# Patient Record
Sex: MALE | Race: WHITE | NOT HISPANIC OR LATINO | Employment: FULL TIME | URBAN - METROPOLITAN AREA
[De-identification: names, ages, dates, MRNs, and addresses within clinical notes are randomized per-mention and may not be internally consistent; named-entity substitution may affect disease eponyms.]

---

## 2018-02-12 ENCOUNTER — OFFICE VISIT CONVERTED (OUTPATIENT)
Dept: FAMILY MEDICINE CLINIC | Facility: CLINIC | Age: 48
End: 2018-02-12
Attending: PHYSICIAN ASSISTANT

## 2018-02-12 ENCOUNTER — CONVERSION ENCOUNTER (OUTPATIENT)
Dept: FAMILY MEDICINE CLINIC | Facility: CLINIC | Age: 48
End: 2018-02-12

## 2018-06-14 ENCOUNTER — OFFICE VISIT CONVERTED (OUTPATIENT)
Dept: FAMILY MEDICINE CLINIC | Facility: CLINIC | Age: 48
End: 2018-06-14
Attending: PHYSICIAN ASSISTANT

## 2018-06-14 ENCOUNTER — CONVERSION ENCOUNTER (OUTPATIENT)
Dept: FAMILY MEDICINE CLINIC | Facility: CLINIC | Age: 48
End: 2018-06-14

## 2019-01-15 ENCOUNTER — CONVERSION ENCOUNTER (OUTPATIENT)
Dept: FAMILY MEDICINE CLINIC | Facility: CLINIC | Age: 49
End: 2019-01-15

## 2019-01-15 ENCOUNTER — OFFICE VISIT CONVERTED (OUTPATIENT)
Dept: FAMILY MEDICINE CLINIC | Facility: CLINIC | Age: 49
End: 2019-01-15
Attending: PHYSICIAN ASSISTANT

## 2019-08-15 ENCOUNTER — OFFICE VISIT CONVERTED (OUTPATIENT)
Dept: FAMILY MEDICINE CLINIC | Facility: CLINIC | Age: 49
End: 2019-08-15
Attending: PHYSICIAN ASSISTANT

## 2020-03-02 ENCOUNTER — OFFICE VISIT CONVERTED (OUTPATIENT)
Dept: FAMILY MEDICINE CLINIC | Facility: CLINIC | Age: 50
End: 2020-03-02
Attending: PHYSICIAN ASSISTANT

## 2020-03-02 ENCOUNTER — CONVERSION ENCOUNTER (OUTPATIENT)
Dept: FAMILY MEDICINE CLINIC | Facility: CLINIC | Age: 50
End: 2020-03-02

## 2020-03-02 ENCOUNTER — HOSPITAL ENCOUNTER (OUTPATIENT)
Dept: LAB | Facility: HOSPITAL | Age: 50
Discharge: HOME | End: 2020-03-02
Attending: PHYSICIAN ASSISTANT

## 2020-03-02 LAB
25(OH)D3 SERPL-MCNC: 15.4 NG/ML (ref 30–100)
ALBUMIN SERPL-MCNC: 4.6 G/DL (ref 3.5–5)
ALBUMIN/GLOB SERPL: 1.6 {RATIO} (ref 1.4–2.6)
ALP SERPL-CCNC: 64 U/L (ref 53–128)
ALT SERPL-CCNC: 28 U/L (ref 10–40)
ANION GAP SERPL CALC-SCNC: 18 MMOL/L (ref 8–19)
APPEARANCE UR: CLEAR
AST SERPL-CCNC: 23 U/L (ref 15–50)
BASOPHILS # BLD AUTO: 0.03 10*3/UL (ref 0–0.2)
BASOPHILS NFR BLD AUTO: 0.5 % (ref 0–3)
BILIRUB SERPL-MCNC: 0.29 MG/DL (ref 0.2–1.3)
BILIRUB UR QL: NEGATIVE
BUN SERPL-MCNC: 17 MG/DL (ref 5–25)
BUN/CREAT SERPL: 18 {RATIO} (ref 6–20)
CALCIUM SERPL-MCNC: 9.4 MG/DL (ref 8.7–10.4)
CHLORIDE SERPL-SCNC: 103 MMOL/L (ref 99–111)
CHOLEST SERPL-MCNC: 187 MG/DL (ref 107–200)
CHOLEST/HDLC SERPL: 4.3 {RATIO} (ref 3–6)
COLOR UR: YELLOW
CONV ABS IMM GRAN: 0.02 10*3/UL (ref 0–0.2)
CONV CO2: 23 MMOL/L (ref 22–32)
CONV COLLECTION SOURCE (UA): NORMAL
CONV IMMATURE GRAN: 0.3 % (ref 0–1.8)
CONV TOTAL PROTEIN: 7.4 G/DL (ref 6.3–8.2)
CONV UROBILINOGEN IN URINE BY AUTOMATED TEST STRIP: 0.2 {EHRLICHU}/DL (ref 0.1–1)
CREAT UR-MCNC: 0.96 MG/DL (ref 0.7–1.2)
DEPRECATED RDW RBC AUTO: 40.8 FL (ref 35.1–43.9)
EOSINOPHIL # BLD AUTO: 0.04 10*3/UL (ref 0–0.7)
EOSINOPHIL # BLD AUTO: 0.7 % (ref 0–7)
ERYTHROCYTE [DISTWIDTH] IN BLOOD BY AUTOMATED COUNT: 12.3 % (ref 11.6–14.4)
GFR SERPLBLD BASED ON 1.73 SQ M-ARVRAT: >60 ML/MIN/{1.73_M2}
GLOBULIN UR ELPH-MCNC: 2.8 G/DL (ref 2–3.5)
GLUCOSE SERPL-MCNC: 89 MG/DL (ref 70–99)
GLUCOSE UR QL: NEGATIVE MG/DL
HCT VFR BLD AUTO: 44.9 % (ref 42–52)
HDLC SERPL-MCNC: 44 MG/DL (ref 40–60)
HGB BLD-MCNC: 14.6 G/DL (ref 14–18)
HGB UR QL STRIP: NEGATIVE
KETONES UR QL STRIP: NEGATIVE MG/DL
LDLC SERPL CALC-MCNC: 123 MG/DL (ref 70–100)
LEUKOCYTE ESTERASE UR QL STRIP: NEGATIVE
LYMPHOCYTES # BLD AUTO: 1.89 10*3/UL (ref 1–5)
LYMPHOCYTES NFR BLD AUTO: 31.3 % (ref 20–45)
MCH RBC QN AUTO: 29.2 PG (ref 27–31)
MCHC RBC AUTO-ENTMCNC: 32.5 G/DL (ref 33–37)
MCV RBC AUTO: 89.8 FL (ref 80–96)
MONOCYTES # BLD AUTO: 0.44 10*3/UL (ref 0.2–1.2)
MONOCYTES NFR BLD AUTO: 7.3 % (ref 3–10)
NEUTROPHILS # BLD AUTO: 3.61 10*3/UL (ref 2–8)
NEUTROPHILS NFR BLD AUTO: 59.9 % (ref 30–85)
NITRITE UR QL STRIP: NEGATIVE
NRBC CBCN: 0 % (ref 0–0.7)
OSMOLALITY SERPL CALC.SUM OF ELEC: 291 MOSM/KG (ref 273–304)
PH UR STRIP.AUTO: 6 [PH] (ref 5–8)
PLATELET # BLD AUTO: 265 10*3/UL (ref 130–400)
PMV BLD AUTO: 10.8 FL (ref 9.4–12.4)
POTASSIUM SERPL-SCNC: 4.1 MMOL/L (ref 3.5–5.3)
PROT UR QL: NEGATIVE MG/DL
PSA SERPL-MCNC: 3.16 NG/ML (ref 0–4)
RBC # BLD AUTO: 5 10*6/UL (ref 4.7–6.1)
SODIUM SERPL-SCNC: 140 MMOL/L (ref 135–147)
SP GR UR: 1.01 (ref 1–1.03)
T4 FREE SERPL-MCNC: 1.2 NG/DL (ref 0.9–1.8)
TRIGL SERPL-MCNC: 102 MG/DL (ref 40–150)
TSH SERPL-ACNC: 2.83 M[IU]/L (ref 0.27–4.2)
VLDLC SERPL-MCNC: 20 MG/DL (ref 5–37)
WBC # BLD AUTO: 6.03 10*3/UL (ref 4.8–10.8)

## 2020-03-03 LAB
CONV MUMPS ANTIBODY IGG: 287 AU/ML
HAV AB SER QL IA: NEGATIVE
HBV SURFACE AB SER QL: NON REACTIVE
MEV IGG SER IA-ACNC: 265 AU/ML
RUBV IGG SER-ACNC: 93.88 [IU]/ML
VZV IGG SER IA-ACNC: 1107 INDEX

## 2020-03-04 LAB
B PERT IGA SER-ACNC: <1 INDEX (ref 0–0.9)
B PERT IGG SER-ACNC: <0.95 INDEX (ref 0–0.94)
B PERT IGM SER-ACNC: 1.1 INDEX (ref 0–0.9)
C DIPHTHERIAE AB SER IA-ACNC: 0.67 IU/ML
C TETANI IGG SER QL: 4.85 IU/ML

## 2020-03-10 LAB
POLIO VIRUS 1 AB [UNITS/VOLUME] IN SERUM: NORMAL
POLIO VIRUS 3 AB [UNITS/VOLUME] IN SERUM: NORMAL

## 2021-05-15 VITALS
HEIGHT: 72 IN | SYSTOLIC BLOOD PRESSURE: 113 MMHG | OXYGEN SATURATION: 96 % | BODY MASS INDEX: 24.67 KG/M2 | DIASTOLIC BLOOD PRESSURE: 76 MMHG | WEIGHT: 182.12 LBS | HEART RATE: 60 BPM

## 2021-05-15 VITALS
SYSTOLIC BLOOD PRESSURE: 131 MMHG | OXYGEN SATURATION: 97 % | HEART RATE: 79 BPM | WEIGHT: 188 LBS | HEIGHT: 72 IN | DIASTOLIC BLOOD PRESSURE: 83 MMHG | BODY MASS INDEX: 25.47 KG/M2

## 2021-05-16 VITALS
DIASTOLIC BLOOD PRESSURE: 70 MMHG | BODY MASS INDEX: 24.65 KG/M2 | WEIGHT: 182 LBS | HEIGHT: 72 IN | SYSTOLIC BLOOD PRESSURE: 110 MMHG | HEART RATE: 96 BPM | OXYGEN SATURATION: 95 %

## 2021-05-16 VITALS
WEIGHT: 191 LBS | OXYGEN SATURATION: 99 % | DIASTOLIC BLOOD PRESSURE: 70 MMHG | BODY MASS INDEX: 25.87 KG/M2 | HEIGHT: 72 IN | HEART RATE: 62 BPM | SYSTOLIC BLOOD PRESSURE: 110 MMHG

## 2021-05-16 VITALS
HEIGHT: 72 IN | OXYGEN SATURATION: 98 % | WEIGHT: 174 LBS | HEART RATE: 78 BPM | DIASTOLIC BLOOD PRESSURE: 70 MMHG | SYSTOLIC BLOOD PRESSURE: 110 MMHG | BODY MASS INDEX: 23.57 KG/M2

## 2021-08-03 DIAGNOSIS — E55.9 VITAMIN D DEFICIENCY: Primary | ICD-10-CM

## 2021-08-03 RX ORDER — ERGOCALCIFEROL 1.25 MG/1
CAPSULE ORAL
Qty: 12 CAPSULE | Refills: 1 | Status: SHIPPED | OUTPATIENT
Start: 2021-08-03 | End: 2022-01-28

## 2022-01-28 DIAGNOSIS — E55.9 VITAMIN D DEFICIENCY: ICD-10-CM

## 2022-01-28 RX ORDER — ERGOCALCIFEROL 1.25 MG/1
CAPSULE ORAL
Qty: 12 CAPSULE | Refills: 1 | Status: SHIPPED | OUTPATIENT
Start: 2022-01-28 | End: 2022-11-22

## 2022-07-20 PROCEDURE — 87591 N.GONORRHOEAE DNA AMP PROB: CPT | Performed by: NURSE PRACTITIONER

## 2022-07-20 PROCEDURE — 87491 CHLMYD TRACH DNA AMP PROBE: CPT | Performed by: NURSE PRACTITIONER

## 2022-07-20 PROCEDURE — 87086 URINE CULTURE/COLONY COUNT: CPT | Performed by: NURSE PRACTITIONER

## 2022-07-21 ENCOUNTER — TELEPHONE (OUTPATIENT)
Dept: URGENT CARE | Facility: CLINIC | Age: 52
End: 2022-07-21

## 2022-10-27 ENCOUNTER — TELEPHONE (OUTPATIENT)
Dept: URGENT CARE | Facility: CLINIC | Age: 52
End: 2022-10-27

## 2022-10-27 PROCEDURE — 87591 N.GONORRHOEAE DNA AMP PROB: CPT | Performed by: NURSE PRACTITIONER

## 2022-10-27 PROCEDURE — 87491 CHLMYD TRACH DNA AMP PROBE: CPT | Performed by: NURSE PRACTITIONER

## 2022-10-27 PROCEDURE — 87086 URINE CULTURE/COLONY COUNT: CPT | Performed by: NURSE PRACTITIONER

## 2022-10-27 NOTE — TELEPHONE ENCOUNTER
Called patient, results reviewed, complete doxycycline as prescribed.  Sexual partner will need treatment as well.  Recommend no sex x1 week after all parties have completed treatment.  Recommend routine condom use.  Follow-up with PCP as needed or if symptoms worsen or persist.  Patient verbalizes understanding.

## 2022-10-29 ENCOUNTER — TELEPHONE (OUTPATIENT)
Dept: URGENT CARE | Facility: CLINIC | Age: 52
End: 2022-10-29

## 2022-10-29 NOTE — TELEPHONE ENCOUNTER
----- Message from RANDY Velázquez sent at 10/28/2022  9:27 PM EDT -----  Please call the patient regarding his negative result.

## 2022-11-22 ENCOUNTER — LAB (OUTPATIENT)
Dept: LAB | Facility: HOSPITAL | Age: 52
End: 2022-11-22

## 2022-11-22 ENCOUNTER — OFFICE VISIT (OUTPATIENT)
Dept: FAMILY MEDICINE CLINIC | Facility: CLINIC | Age: 52
End: 2022-11-22

## 2022-11-22 VITALS
BODY MASS INDEX: 23.84 KG/M2 | SYSTOLIC BLOOD PRESSURE: 109 MMHG | HEART RATE: 66 BPM | OXYGEN SATURATION: 99 % | TEMPERATURE: 97.8 F | DIASTOLIC BLOOD PRESSURE: 70 MMHG | WEIGHT: 176 LBS | HEIGHT: 72 IN

## 2022-11-22 DIAGNOSIS — Z12.5 SCREENING FOR PROSTATE CANCER: ICD-10-CM

## 2022-11-22 DIAGNOSIS — F43.9 STRESS: ICD-10-CM

## 2022-11-22 DIAGNOSIS — Z13.220 LIPID SCREENING: ICD-10-CM

## 2022-11-22 DIAGNOSIS — Z13.29 THYROID DISORDER SCREENING: ICD-10-CM

## 2022-11-22 DIAGNOSIS — Z13.6 SCREENING FOR CARDIOVASCULAR CONDITION: ICD-10-CM

## 2022-11-22 DIAGNOSIS — Z12.11 SCREEN FOR COLON CANCER: ICD-10-CM

## 2022-11-22 DIAGNOSIS — F41.9 ANXIETY: Primary | ICD-10-CM

## 2022-11-22 LAB
BASOPHILS # BLD AUTO: 0.02 10*3/MM3 (ref 0–0.2)
BASOPHILS NFR BLD AUTO: 0.4 % (ref 0–1.5)
DEPRECATED RDW RBC AUTO: 39.7 FL (ref 37–54)
EOSINOPHIL # BLD AUTO: 0.06 10*3/MM3 (ref 0–0.4)
EOSINOPHIL NFR BLD AUTO: 1.1 % (ref 0.3–6.2)
ERYTHROCYTE [DISTWIDTH] IN BLOOD BY AUTOMATED COUNT: 12.2 % (ref 12.3–15.4)
HCT VFR BLD AUTO: 41.4 % (ref 37.5–51)
HGB BLD-MCNC: 13.9 G/DL (ref 13–17.7)
IMM GRANULOCYTES # BLD AUTO: 0.01 10*3/MM3 (ref 0–0.05)
IMM GRANULOCYTES NFR BLD AUTO: 0.2 % (ref 0–0.5)
LYMPHOCYTES # BLD AUTO: 1.76 10*3/MM3 (ref 0.7–3.1)
LYMPHOCYTES NFR BLD AUTO: 32.2 % (ref 19.6–45.3)
MCH RBC QN AUTO: 29.9 PG (ref 26.6–33)
MCHC RBC AUTO-ENTMCNC: 33.6 G/DL (ref 31.5–35.7)
MCV RBC AUTO: 89 FL (ref 79–97)
MONOCYTES # BLD AUTO: 0.38 10*3/MM3 (ref 0.1–0.9)
MONOCYTES NFR BLD AUTO: 6.9 % (ref 5–12)
NEUTROPHILS NFR BLD AUTO: 3.24 10*3/MM3 (ref 1.7–7)
NEUTROPHILS NFR BLD AUTO: 59.2 % (ref 42.7–76)
NRBC BLD AUTO-RTO: 0 /100 WBC (ref 0–0.2)
PLATELET # BLD AUTO: 246 10*3/MM3 (ref 140–450)
PMV BLD AUTO: 10.3 FL (ref 6–12)
PSA SERPL-MCNC: 5.37 NG/ML (ref 0–4)
RBC # BLD AUTO: 4.65 10*6/MM3 (ref 4.14–5.8)
T4 FREE SERPL-MCNC: 1.08 NG/DL (ref 0.93–1.7)
TSH SERPL DL<=0.05 MIU/L-ACNC: 1.41 UIU/ML (ref 0.27–4.2)
WBC NRBC COR # BLD: 5.47 10*3/MM3 (ref 3.4–10.8)

## 2022-11-22 PROCEDURE — 80061 LIPID PANEL: CPT | Performed by: NURSE PRACTITIONER

## 2022-11-22 PROCEDURE — 80050 GENERAL HEALTH PANEL: CPT

## 2022-11-22 PROCEDURE — 36415 COLL VENOUS BLD VENIPUNCTURE: CPT

## 2022-11-22 PROCEDURE — G0103 PSA SCREENING: HCPCS

## 2022-11-22 PROCEDURE — 99203 OFFICE O/P NEW LOW 30 MIN: CPT | Performed by: NURSE PRACTITIONER

## 2022-11-22 PROCEDURE — 84439 ASSAY OF FREE THYROXINE: CPT

## 2022-11-22 NOTE — PROGRESS NOTES
"  ENCOUNTER DATE:  11/22/2022    Smooth Tse / 52 y.o. / male      CHIEF COMPLAINT     Establish Care (Gene Site Testing )      VITALS     Visit Vitals  /70 (BP Location: Left arm, Patient Position: Sitting, Cuff Size: Adult)   Pulse 66   Temp 97.8 °F (36.6 °C) (Temporal)   Ht 182.9 cm (72\")   Wt 79.8 kg (176 lb)   SpO2 99%   BMI 23.87 kg/m²       BP Readings from Last 3 Encounters:   11/22/22 109/70   10/27/22 120/78   07/20/22 123/77     Wt Readings from Last 3 Encounters:   11/22/22 79.8 kg (176 lb)   10/27/22 79.4 kg (175 lb)   07/20/22 81.2 kg (179 lb)      Body mass index is 23.87 kg/m².    MEDICATIONS     Current Outpatient Medications on File Prior to Visit   Medication Sig Dispense Refill   • vitamin D (ERGOCALCIFEROL) 1.25 MG (95005 UT) capsule capsule TAKE ONE CAPSULE BY MOUTH ONCE WEEKLY 12 capsule 1     No current facility-administered medications on file prior to visit.         HISTORY OF PRESENT ILLNESS     Smooth presents for annual health maintenance visit.    · Last health maintenance visit: {VALENTINA ROSARIO WHEN (Optional):21262}  · General health: {GOOD/FAIR/POOR/EXCELLENT:74398}  · Lifestyle:  · Attempting to lose weight?: {VALENTINA ROSARIO YES/NO (Optional):21261}  · Diet: {melissa rosario diet:12020}  · Exercise: {melissa rosario exercise:95387}  · Tobacco: {Tobacco use:77400}   · Alcohol: {Alcohol consumption:17226}  · Work: {VALENTINA ROSAROI WORK:48779}  · Domestic abuse concerns: {VALENTINA ROSARIO YES/NO (Optional):21261}  · Depression Screening:          PHQ-9 Depression Screening  Little interest or pleasure in doing things? 0-->not at all   Feeling down, depressed, or hopeless? 0-->not at all   Trouble falling or staying asleep, or sleeping too much?     Feeling tired or having little energy?     Poor appetite or overeating?     Feeling bad about yourself - or that you are a failure or have let yourself or your family down?     Trouble concentrating on things, such as reading the newspaper or watching television?     Moving or " speaking so slowly that other people could have noticed? Or the opposite - being so fidgety or restless that you have been moving around a lot more than usual?     Thoughts that you would be better off dead, or of hurting yourself in some way?     PHQ-9 Total Score 0   If you checked off any problems, how difficult have these problems made it for you to do your work, take care of things at home, or get along with other people?           SAUD-7           Patient Care Team:  Chelsi Payton APRN as PCP - General (Nurse Practitioner)      ALLERGIES  No Known Allergies     PFSH:     The following portions of the patient's history were reviewed and updated as appropriate: Allergies / Current Medications / Past Medical History / Surgical History / Social History / Family History    PROBLEM LIST   There is no problem list on file for this patient.      PAST MEDICAL HISTORY  Past Medical History:   Diagnosis Date   • Anxiety    • Depression        SURGICAL HISTORY  Past Surgical History:   Procedure Laterality Date   • VASECTOMY         SOCIAL HISTORY  Social History     Socioeconomic History   • Marital status:    Tobacco Use   • Smoking status: Never   • Smokeless tobacco: Never   Vaping Use   • Vaping Use: Former   Substance and Sexual Activity   • Alcohol use: Not Currently   • Drug use: Never   • Sexual activity: Yes     Partners: Female     Birth control/protection: Condom, Vasectomy       FAMILY HISTORY  Family History   Problem Relation Age of Onset   • Depression Mother    • Cancer Mother    • Anxiety disorder Mother    • Depression Father    • Cancer Father    • Anxiety disorder Father        IMMUNIZATION HISTORY    There is no immunization history on file for this patient.    HPI  HPI    Physical exam  Physical Exam          REVIEWED DATA      Labs:    Lab Results   Component Value Date     03/02/2020    K 4.1 03/02/2020    CALCIUM 9.4 03/02/2020    AST 23 03/02/2020    ALT 28 03/02/2020    BUN 17  03/02/2020    CREATININE 0.96 03/02/2020       Lab Results   Component Value Date    TSH 2.830 03/02/2020    FREET4 1.2 03/02/2020       Lab Results   Component Value Date     (H) 03/02/2020    HDL 44 03/02/2020    TRIG 102 03/02/2020    CHOLHDLRATIO 4.3 03/02/2020       Lab Results   Component Value Date    RYFJ53VU 15.4 (L) 03/02/2020        Lab Results   Component Value Date    WBC 6.03 03/02/2020    HGB 14.6 03/02/2020    MCV 89.8 03/02/2020     03/02/2020       Lab Results   Component Value Date    GLUCOSEU NEGATIVE 03/02/2020    BLOODU NEGATIVE 03/02/2020    NITRITEU NEGATIVE 03/02/2020    LEUKOCYTESUR Small (1+) (A) 10/27/2022        No results found for: HEPCVIRUSABY        ASSESSMENT & PLAN     There are no diagnoses linked to this encounter.    HEALTHCARE MAINTENANCE ISSUES     Cancer Screening:  · Colon: Initial/Next screening at age: {VALENTINA ROMERO AGE SCREEN:21263}  · Repeat colon cancer screening: {VALENTINA ROMERO YEARS INTERVAL:21264}  · Skin: Monthly self skin examination, annual exam by health professional      Lifestyle Counseling:  · Lifestyle Modifications: {Lifestyle:09258}  · Safety Issues: Always wear seatbelt, Avoid texting while driving   · Use sunscreen, regular skin examination  · Recommended annual dental/vision examination.  · Emotional/Stress/Sleep: Reviewed and  given when appropriate      Part of this note may be electronic transcription/translation of spoken language to printed text using the Dragon dictation system

## 2022-11-22 NOTE — PROGRESS NOTES
"Chief Complaint  Establish Care (Gene Site Testing )    Subjective         Smooth Tse presents to [facility]  HPI     Wants to talk about GeneSite Testing.  His daughter got it done and now he wants to do it.      Anxiety and Stress- Seeing a therapist for anxiety and helps alot      PHQ-2 Total Score: 0  PHQ-9 Total Score: 0       Social History     Socioeconomic History   • Marital status:    Tobacco Use   • Smoking status: Never   • Smokeless tobacco: Never   Vaping Use   • Vaping Use: Former   Substance and Sexual Activity   • Alcohol use: Not Currently   • Drug use: Never   • Sexual activity: Yes     Partners: Female     Birth control/protection: Condom, Vasectomy        Objective     Vitals:    11/22/22 1052   BP: 109/70   BP Location: Left arm   Patient Position: Sitting   Cuff Size: Adult   Pulse: 66   Temp: 97.8 °F (36.6 °C)   TempSrc: Temporal   SpO2: 99%   Weight: 79.8 kg (176 lb)   Height: 182.9 cm (72\")        Body mass index is 23.87 kg/m².    Wt Readings from Last 3 Encounters:   11/22/22 79.8 kg (176 lb)   10/27/22 79.4 kg (175 lb)   07/20/22 81.2 kg (179 lb)       BP Readings from Last 3 Encounters:   11/22/22 109/70   10/27/22 120/78   07/20/22 123/77       BMI is within normal parameters. No other follow-up for BMI required.          Physical Exam  Vitals reviewed.   Constitutional:       Appearance: Normal appearance.   HENT:      Head: Normocephalic and atraumatic.   Eyes:      Conjunctiva/sclera: Conjunctivae normal.      Pupils: Pupils are equal, round, and reactive to light.   Cardiovascular:      Rate and Rhythm: Normal rate and regular rhythm.      Pulses: Normal pulses.      Heart sounds: Normal heart sounds.   Pulmonary:      Effort: Pulmonary effort is normal.      Breath sounds: Normal breath sounds.   Abdominal:      General: Bowel sounds are normal.      Palpations: Abdomen is soft.   Musculoskeletal:      Cervical back: Normal range of " motion.   Skin:     General: Skin is warm and dry.   Neurological:      Mental Status: He is alert and oriented to person, place, and time.   Psychiatric:         Mood and Affect: Mood normal.         Behavior: Behavior normal.         Thought Content: Thought content normal.         Judgment: Judgment normal.          Result Review :   The following data was reviewed by: RANDY Landa on 11/22/2022:      Procedures    Assessment and Plan   Diagnoses and all orders for this visit:    1. Anxiety (Primary)  -     GeneSight - Swab,; Future    2. Stress  -     GeneSight - Swab,; Future    3. Lipid screening  -     Lipid Panel    4. Screening for cardiovascular condition  -     CBC w AUTO Differential; Future  -     Comprehensive metabolic panel; Future    5. Screening for prostate cancer  -     PSA SCREENING; Future    6. Screen for colon cancer  -     Cologuard - Stool, Per Rectum; Future    7. Thyroid disorder screening  -     TSH; Future  -     T4, free; Future            Follow Up   No follow-ups on file.  Patient was given instructions and counseling regarding his condition or for health maintenance advice. Please see specific information pulled into the AVS if appropriate.     Please note that portions of this note were completed with a voice recognition program.    Electronically signed by RANDY Landa  11/22/2022, 11:16 EST

## 2022-11-23 LAB
ALBUMIN SERPL-MCNC: 4.3 G/DL (ref 3.5–5.2)
ALBUMIN/GLOB SERPL: 1.7 G/DL
ALP SERPL-CCNC: 70 U/L (ref 39–117)
ALT SERPL W P-5'-P-CCNC: 16 U/L (ref 1–41)
ANION GAP SERPL CALCULATED.3IONS-SCNC: 9.2 MMOL/L (ref 5–15)
AST SERPL-CCNC: 20 U/L (ref 1–40)
BILIRUB SERPL-MCNC: 0.3 MG/DL (ref 0–1.2)
BUN SERPL-MCNC: 16 MG/DL (ref 6–20)
BUN/CREAT SERPL: 18 (ref 7–25)
CALCIUM SPEC-SCNC: 8.9 MG/DL (ref 8.6–10.5)
CHLORIDE SERPL-SCNC: 102 MMOL/L (ref 98–107)
CHOLEST SERPL-MCNC: 190 MG/DL (ref 0–200)
CO2 SERPL-SCNC: 28.8 MMOL/L (ref 22–29)
CREAT SERPL-MCNC: 0.89 MG/DL (ref 0.76–1.27)
EGFRCR SERPLBLD CKD-EPI 2021: 103.8 ML/MIN/1.73
GLOBULIN UR ELPH-MCNC: 2.5 GM/DL
GLUCOSE SERPL-MCNC: 118 MG/DL (ref 65–99)
HDLC SERPL-MCNC: 47 MG/DL (ref 40–60)
LDLC SERPL CALC-MCNC: 124 MG/DL (ref 0–100)
LDLC/HDLC SERPL: 2.6 {RATIO}
POTASSIUM SERPL-SCNC: 3.9 MMOL/L (ref 3.5–5.2)
PROT SERPL-MCNC: 6.8 G/DL (ref 6–8.5)
SODIUM SERPL-SCNC: 140 MMOL/L (ref 136–145)
TRIGL SERPL-MCNC: 105 MG/DL (ref 0–150)
VLDLC SERPL-MCNC: 19 MG/DL (ref 5–40)

## 2022-11-23 RX ORDER — CIPROFLOXACIN 500 MG/1
500 TABLET, FILM COATED ORAL 2 TIMES DAILY
Qty: 60 TABLET | Refills: 0 | Status: SHIPPED | OUTPATIENT
Start: 2022-11-23 | End: 2023-01-02

## 2022-11-28 ENCOUNTER — TELEPHONE (OUTPATIENT)
Dept: FAMILY MEDICINE CLINIC | Facility: CLINIC | Age: 52
End: 2022-11-28

## 2022-11-28 DIAGNOSIS — R97.20 ELEVATED PSA: Primary | ICD-10-CM

## 2022-11-28 NOTE — TELEPHONE ENCOUNTER
S/w RANDY Chadwick about this matter. She stated for pt to discontinue medication and place referral for Urology to further treat. Called pt to advise him of this and no answer. Left vm for pt to call office back

## 2022-11-28 NOTE — TELEPHONE ENCOUNTER
Caller: Smooth Tse    Relationship: Self    Best call back number: [phone]    What medications are you currently taking:   Current Outpatient Medications on File Prior to Visit   Medication Sig Dispense Refill   • ciprofloxacin (Cipro) 500 MG tablet Take 1 tablet by mouth 2 (Two) Times a Day. 60 tablet 0     No current facility-administered medications on file prior to visit.          When did you start taking these medications: 11.22.2022    Which medication are you concerned about: ciprofloxacin (Cipro) 500 MG tablet    Who prescribed you this medication: APRN MONEY    What are your concerns: PATIENT IS HAVING MOOD SWINGS AND BECOMING VERY IRRITABLE.     How long have you had these concerns: SINCE STARTING MEDICATION

## 2022-12-19 ENCOUNTER — CLINICAL SUPPORT (OUTPATIENT)
Dept: FAMILY MEDICINE CLINIC | Facility: CLINIC | Age: 52
End: 2022-12-19
Payer: COMMERCIAL

## 2022-12-19 DIAGNOSIS — Z11.1 SCREENING FOR TUBERCULOSIS: Primary | ICD-10-CM

## 2022-12-19 PROCEDURE — 86580 TB INTRADERMAL TEST: CPT | Performed by: NURSE PRACTITIONER

## 2022-12-21 ENCOUNTER — CLINICAL SUPPORT (OUTPATIENT)
Dept: FAMILY MEDICINE CLINIC | Facility: CLINIC | Age: 52
End: 2022-12-21

## 2022-12-21 DIAGNOSIS — Z11.1 SCREENING FOR TUBERCULOSIS: Primary | ICD-10-CM

## 2022-12-21 PROCEDURE — 86580 TB INTRADERMAL TEST: CPT | Performed by: NURSE PRACTITIONER

## 2022-12-29 ENCOUNTER — TELEPHONE (OUTPATIENT)
Dept: UROLOGY | Facility: CLINIC | Age: 52
End: 2022-12-29

## 2022-12-29 ENCOUNTER — TELEPHONE (OUTPATIENT)
Dept: FAMILY MEDICINE CLINIC | Facility: CLINIC | Age: 52
End: 2022-12-29
Payer: COMMERCIAL

## 2022-12-29 NOTE — TELEPHONE ENCOUNTER
[facility] office called and said that patient canceled his first appt with them and needed to let us know.

## 2023-04-26 PROCEDURE — 87070 CULTURE OTHR SPECIMN AEROBIC: CPT | Performed by: NURSE PRACTITIONER

## 2023-04-26 PROCEDURE — 87205 SMEAR GRAM STAIN: CPT | Performed by: NURSE PRACTITIONER

## 2023-04-29 ENCOUNTER — TELEPHONE (OUTPATIENT)
Dept: URGENT CARE | Facility: CLINIC | Age: 53
End: 2023-04-29
Payer: COMMERCIAL

## 2023-04-29 NOTE — TELEPHONE ENCOUNTER
----- Message from RANDY Rodriguez sent at 4/29/2023  9:40 AM EDT -----  Please call the patient regarding his normal result.    Please inform patient that the wound culture that was performed of his back has resulted with no organisms or bacteria seen.    Please inform patient that if he is still having symptoms or concerns persist he should follow-up with his primary care provider which is listed as Tiffanie Payton.

## 2023-09-22 ENCOUNTER — TELEPHONE (OUTPATIENT)
Dept: FAMILY MEDICINE CLINIC | Facility: CLINIC | Age: 53
End: 2023-09-22

## 2023-09-22 NOTE — TELEPHONE ENCOUNTER
Patient called asking for labs for SCI (sexual contact infection). He feels like he has been exposed and is having symptoms.   Do you need to see him in the office or just order labs?  PLEASE ADVISE

## 2023-09-25 ENCOUNTER — OFFICE VISIT (OUTPATIENT)
Dept: FAMILY MEDICINE CLINIC | Facility: CLINIC | Age: 53
End: 2023-09-25

## 2023-09-25 VITALS
HEIGHT: 72 IN | WEIGHT: 179.4 LBS | BODY MASS INDEX: 24.3 KG/M2 | TEMPERATURE: 98.7 F | SYSTOLIC BLOOD PRESSURE: 121 MMHG | OXYGEN SATURATION: 97 % | DIASTOLIC BLOOD PRESSURE: 75 MMHG | HEART RATE: 77 BPM

## 2023-09-25 DIAGNOSIS — F41.9 ANXIETY DISORDER, UNSPECIFIED TYPE: ICD-10-CM

## 2023-09-25 DIAGNOSIS — B00.1 COLD SORE: ICD-10-CM

## 2023-09-25 DIAGNOSIS — A64 STD (MALE): Primary | ICD-10-CM

## 2023-09-25 LAB
HAV IGM SERPL QL IA: NORMAL
HBV CORE IGM SERPL QL IA: NORMAL
HBV SURFACE AG SERPL QL IA: NORMAL
HCV AB SER DONR QL: NORMAL
HIV 1+2 AB+HIV1 P24 AG SERPL QL IA: NORMAL

## 2023-09-25 PROCEDURE — 86592 SYPHILIS TEST NON-TREP QUAL: CPT | Performed by: NURSE PRACTITIONER

## 2023-09-25 PROCEDURE — 87591 N.GONORRHOEAE DNA AMP PROB: CPT | Performed by: NURSE PRACTITIONER

## 2023-09-25 PROCEDURE — 86696 HERPES SIMPLEX TYPE 2 TEST: CPT | Performed by: NURSE PRACTITIONER

## 2023-09-25 PROCEDURE — 87522 HEPATITIS C REVRS TRNSCRPJ: CPT | Performed by: NURSE PRACTITIONER

## 2023-09-25 PROCEDURE — G0432 EIA HIV-1/HIV-2 SCREEN: HCPCS | Performed by: NURSE PRACTITIONER

## 2023-09-25 PROCEDURE — 86695 HERPES SIMPLEX TYPE 1 TEST: CPT | Performed by: NURSE PRACTITIONER

## 2023-09-25 PROCEDURE — 87491 CHLMYD TRACH DNA AMP PROBE: CPT | Performed by: NURSE PRACTITIONER

## 2023-09-25 PROCEDURE — 87661 TRICHOMONAS VAGINALIS AMPLIF: CPT | Performed by: NURSE PRACTITIONER

## 2023-09-25 PROCEDURE — 80074 ACUTE HEPATITIS PANEL: CPT | Performed by: NURSE PRACTITIONER

## 2023-09-25 RX ORDER — VALACYCLOVIR HYDROCHLORIDE 1 G/1
1000 TABLET, FILM COATED ORAL DAILY
Qty: 90 TABLET | Refills: 1 | Status: SHIPPED | OUTPATIENT
Start: 2023-09-25

## 2023-09-25 NOTE — PROGRESS NOTES
"Chief Complaint  Exposure to STD (Would like blood work ) and referral (To psych-  )    Subjective          Smooth Tse is a 52 y.o. male who presents to [facility]    History of Present Illness    Wants to have STD testing done, wife has a history of HSV.    PHQ-2 Total Score:     PHQ-9 Total Score:          Review of Systems       Medical History: has a past medical history of Anxiety and Depression.     Surgical History: has a past surgical history that includes Vasectomy.     Family History: family history includes Anxiety disorder in his father and mother; Cancer in his father and mother; Depression in his father and mother.     Social History: reports that he has never smoked. He has never used smokeless tobacco. He reports that he does not currently use alcohol. He reports that he does not use drugs.    Allergies: Ciprofloxacin      Health Maintenance Due   Topic Date Due    COVID-19 Vaccine (1) Never done    TDAP/TD VACCINES (1 - Tdap) Never done    HEPATITIS C SCREENING  Never done    ANNUAL PHYSICAL  Never done    ZOSTER VACCINE (1 of 2) Never done            Current Outpatient Medications:     LORazepam (ATIVAN) 0.5 MG tablet, , Disp: , Rfl:     valACYclovir (Valtrex) 1000 MG tablet, Take 1 tablet by mouth Daily., Disp: 90 tablet, Rfl: 1      Immunization History   Administered Date(s) Administered    PPD Test 12/21/2022         Objective       Vitals:    09/25/23 1127   BP: 121/75   BP Location: Right arm   Patient Position: Sitting   Cuff Size: Adult   Pulse: 77   Temp: 98.7 °F (37.1 °C)   TempSrc: Temporal   SpO2: 97%   Weight: 81.4 kg (179 lb 6.4 oz)   Height: 182.9 cm (72\")   PainSc: 0-No pain      Body mass index is 24.33 kg/m².   Wt Readings from Last 3 Encounters:   09/25/23 81.4 kg (179 lb 6.4 oz)   04/26/23 83.5 kg (184 lb)   11/22/22 79.8 kg (176 lb)      BP Readings from Last 3 Encounters:   09/25/23 121/75   04/26/23 104/79   01/02/23 133/84    "     BMI is within normal parameters. No other follow-up for BMI required.       Physical Exam  Vitals reviewed.   Constitutional:       Appearance: Normal appearance.   Skin:     General: Skin is warm and dry.   Neurological:      Mental Status: He is alert and oriented to person, place, and time.   Psychiatric:         Mood and Affect: Mood normal.         Behavior: Behavior normal.         Thought Content: Thought content normal.         Judgment: Judgment normal.           Result Review :       Common labs          11/22/2022    12:33   Common Labs   Glucose 118    BUN 16    Creatinine 0.89    Sodium 140    Potassium 3.9    Chloride 102    Calcium 8.9    Albumin 4.30    Total Bilirubin 0.3    Alkaline Phosphatase 70    AST (SGOT) 20    ALT (SGPT) 16    WBC 5.47    Hemoglobin 13.9    Hematocrit 41.4    Platelets 246    Total Cholesterol 190    Triglycerides 105    HDL Cholesterol 47    LDL Cholesterol  124    PSA 5.370                       Assessment and Plan        Diagnoses and all orders for this visit:    1. STD (male) (Primary)  -     Hepatitis panel, acute; Future  -     HIV-1/O/2 ANTIGEN/ANTIBODY, 4TH GENERATION; Future  -     HSV 1 and 2-Specific Ab, IgG; Future  -     RPR; Future  -     Hepatitis C RNA, quantitative, PCR (graph); Future  -     Chlamydia trachomatis, Neisseria gonorrhoeae, Trichomonas vaginalis, PCR - Urine, Urine, Clean Catch  -     Hepatitis panel, acute  -     HIV-1/O/2 ANTIGEN/ANTIBODY, 4TH GENERATION  -     HSV 1 and 2-Specific Ab, IgG  -     RPR  -     Hepatitis C RNA, quantitative, PCR (graph)    2. Anxiety disorder, unspecified type  -     Ambulatory Referral to Psychiatry    3. Cold sore  -     valACYclovir (Valtrex) 1000 MG tablet; Take 1 tablet by mouth Daily.  Dispense: 90 tablet; Refill: 1          Follow Up     Return if symptoms worsen or fail to improve.    Patient was given instructions and counseling regarding his condition or for health maintenance advice. Please see  specific information pulled into the AVS if appropriate.     Chelsi Payton, APRN

## 2023-09-26 LAB
HSV1 IGG SER IA-ACNC: 24.6 INDEX (ref 0–0.9)
HSV2 IGG SER IA-ACNC: <0.91 INDEX (ref 0–0.9)
RPR SER QL: NORMAL

## 2023-09-27 LAB
C TRACH RRNA SPEC QL NAA+PROBE: NEGATIVE
HCV RNA SERPL NAA+PROBE-ACNC: NORMAL IU/ML
N GONORRHOEA RRNA SPEC QL NAA+PROBE: NEGATIVE
T VAGINALIS RRNA SPEC QL NAA+PROBE: NEGATIVE
TEST INFORMATION: NORMAL

## 2023-12-18 NOTE — PROGRESS NOTES
"Subjective   Smooth Tse is a 53 y.o. male who presents today for initial evaluation     Referring Provider:  Chelsi Payton, APRN  [address] [place] [address]    Chief Complaint:  SAUD    History of Present Illness:     Chart review:     Kofi: blank  Care Everywhere: none    Psychotropic medication chart review:  Present:  none    Previously:  Ativan  Trintellix 10 mg qday    EKG: none  Procedures: none  Head imaging: none  Labs: gluc 118 on cmp, reassuring thyroid studies, cbc. High ldl.          Chart notes: Seen  by PCP. Wanted a referral to psych.      \"Maynor\"  Patient Psychotherapy Notes:  Patient goals:  Misc:      In person.  Interview:  Chart review:   His/Her Story: \"My main gist of being here is 10 min every 3 or 4 months.\"  P5, G11  That's when bad Maynor comes out  Most of the time I'm pretty laid back but things can slowly build over several months, then sometimes I can yell at the top of my lungs, or throw things.  I've hit a wall before.  Then it's over and I'm fine... but it builds again.  Last episode was September.  I also have \"depressive episodes\".  Going on my whole life. 9 yo that's when I felt \"this darkness come over me.\"  I saw my Mom weep during her episodes (maybe 15 min). Usually happy.  Her brother recently . He was known to have his sad times.  Growing up it was kindof cold, even though it was loving. You couldn't be really expressive.  I was living in a fantasy world to escape to a more exciting world  I used to lie, forge signatures for school  I drank at 16 yo, \"I needed alcohol.\" It turned me into this outgoing and fun charu.  Then drugs. I loved cocaine.  Depression/Mood: A little depressed   Depressed mood  Seasonal pattern: yes  Severity: Moderate  Duration: On and off for years since 9 yo  Anxiety:  Uncontrolled worrying, muscle tension, fatigue,  feeling on edge or restless, irritability, insomnia.  Severity: Moderate  Sense of doom  Duration: " On and off for years since 9 yo  Panic attacks:  Psych ROS: Positive for depression, anxiety.  Negative for psychosis but positive for hypomania.  ADHD: def  PTSD: def  No SI HI AVH.  Medication compliant: na  Has a therapist for 10 years. Nick.    Access to Firearms: denies    PHQ-9 Depression Screening  PHQ-9 Total Score: 5    Little interest or pleasure in doing things? 2-->more than half the days   Feeling down, depressed, or hopeless? 0-->not at all   Trouble falling or staying asleep, or sleeping too much? 0-->not at all   Feeling tired or having little energy? 1-->several days   Poor appetite or overeating? 0-->not at all   Feeling bad about yourself - or that you are a failure or have let yourself or your family down? 2-->more than half the days   Trouble concentrating on things, such as reading the newspaper or watching television? 0-->not at all   Moving or speaking so slowly that other people could have noticed? Or the opposite - being so fidgety or restless that you have been moving around a lot more than usual? 0-->not at all   Thoughts that you would be better off dead, or of hurting yourself in some way? 0-->not at all   PHQ-9 Total Score 5     SAUD-7  Feeling nervous, anxious or on edge: More than half the days  Not being able to stop or control worrying: More than half the days  Worrying too much about different things: More than half the days  Trouble Relaxing: More than half the days  Being so restless that it is hard to sit still: Not at all  Feeling afraid as if something awful might happen: Several days  Becoming easily annoyed or irritable: More than half the days  SAUD 7 Total Score: 11  If you checked any problems, how difficult have these problems made it for you to do your work, take care of things at home, or get along with other people: Somewhat difficult    Past Surgical History:  Past Surgical History:   Procedure Laterality Date    VASECTOMY         Problem List:  Patient Active  Problem List   Diagnosis    Anxiety disorder    Stress    Lipid screening       Allergy:   Allergies   Allergen Reactions    Ciprofloxacin Mental Status Change        Discontinued Medications:  Medications Discontinued During This Encounter   Medication Reason    LORazepam (ATIVAN) 0.5 MG tablet *Therapy completed    valACYclovir (Valtrex) 1000 MG tablet *Therapy completed    amoxicillin (AMOXIL) 875 MG tablet Non-compliance    predniSONE (DELTASONE) 20 MG tablet Non-compliance       Current Medications:   Current Outpatient Medications   Medication Sig Dispense Refill    ARIPiprazole (Abilify) 2 MG tablet Take 1 tablet by mouth Daily. 30 tablet 2     No current facility-administered medications for this visit.       Past Medical History:  Past Medical History:   Diagnosis Date    Anxiety     Depression        Past Psychiatric History:  Began Treatment: 15 years ago  Diagnoses: depression  Psychiatrist: multiple  Therapist: once a month  Admission History:Denies  Medication Trials:    Many    Pristiq, others    Wellbutrin: I got so spaced out.    Never put on a mood stabilizer    Self Harm: Denies  Suicide Attempts:Denies      Substance Abuse History:   Types: cannabis  Withdrawal Symptoms:Denies  Longest Period Sober:Not Applicable   AA: Not applicable     Social History:  Martial Status:  Employed:Yes  Kids:Yes  House:Lives in a house   History: Denies    Social History     Socioeconomic History    Marital status:    Tobacco Use    Smoking status: Never    Smokeless tobacco: Never   Vaping Use    Vaping Use: Former   Substance and Sexual Activity    Alcohol use: Not Currently    Drug use: Never    Sexual activity: Yes     Partners: Female     Birth control/protection: Condom, Vasectomy       Family History: (1st degree)  Suicide Attempts: Denies  Suicide Completions:Denies      Family History   Problem Relation Age of Onset    Depression Mother     Cancer Mother     Anxiety disorder Mother   "   Depression Father     Cancer Father     Anxiety disorder Father        Developmental History:       Childhood: Denies Abuse, but my parents were older, Mom had me when she was 40, dad was 46  High School:Completed  College: 5 yr, no degree    Mental Status Exam  Appearance  : groomed, good eye contact, normal street clothes  Behavior  : pleasant and cooperative  Motor  : No abnormal  Speech  :normal rhythm, rate, volume, tone, not hyperverbal, not pressured, normal prosidy  Mood  : \"anxious, but I have depressive episodes too\"  Affect  : euthymic but near tears a few times, mood congruent, good variability  Thought Content  : negative suicidal ideations, negative homicidal ideations, negative obsessions  Perceptions  : negative auditory hallucinations, negative visual hallucinations  Thought Process  : circumstantial  Insight/Judgement  : Fair/fair  Cognition  : grossly intact  Attention   : intact      Review of Systems:  Review of Systems    Physical Exam:  Physical Exam    Vital Signs:   /67   Pulse 74   Ht 182.9 cm (72.01\")   Wt 82.4 kg (181 lb 9.6 oz)   BMI 24.62 kg/m²      Lab Results:   Office Visit on 09/25/2023   Component Date Value Ref Range Status    Chlamydia trachomatis, RANDALL 09/25/2023 Negative  Negative Final    Gonococcus by RANDALL 09/25/2023 Negative  Negative Final    Trichomonas vaginosis 09/25/2023 Negative  Negative Final    Hepatitis B Surface Ag 09/25/2023 Non-Reactive  Non-Reactive Final    Hep A IgM 09/25/2023 Non-Reactive  Non-Reactive Final    Hep B C IgM 09/25/2023 Non-Reactive  Non-Reactive Final    Hepatitis C Ab 09/25/2023 Non-Reactive  Non-Reactive Final    HIV DUO 09/25/2023 Non-Reactive  Non-Reactive Final    HSV 1 IgG, Type Specific 09/25/2023 24.60 (H)  0.00 - 0.90 index Final                                     Negative        <0.91                                   Equivocal 0.91 - 1.09                                   Positive        >1.09   Note: Negative indicates " no antibodies detected to   HSV-1. Equivocal may suggest early infection.  If   clinically appropriate, retest at later date. Positive   indicates antibodies detected to HSV-1.    HSV 2 IgG, Type Spec 09/25/2023 <0.91  0.00 - 0.90 index Final                                     Negative        <0.91                                   Equivocal 0.91 - 1.09                                   Positive        >1.09   HSV-2 Antibody Interpretation: Negative indicates no   detectable antibodies to HSV-2 were found. If recent   exposure is suspected, retest in 4-6 weeks. Equivocal   samples should be retested in 4-6 weeks. Positive   indicates the presence of detectable IgG antibody to   HSV-2. False positive results may occur. Repeat   testing, or testing by a different method, may be   indicated in some settings (e.g. patients with low   likelihood of HSV infection). If clinically   appropriate, retest 4-6 weeks later.    RPR 09/25/2023 Non-Reactive  Non-Reactive Final    Hepatitis C Quantitation 09/25/2023 HCV Not Detected  IU/mL Final    Test Information 09/25/2023 Comment   Final    The quantitative range of this assay is 15 IU/mL to 100 million IU/mL.       EKG Results:  No orders to display       Imaging Results:  No Images in the past 120 days found..      Assessment & Plan   Diagnoses and all orders for this visit:    1. Generalized anxiety disorder (Primary)  -     ARIPiprazole (Abilify) 2 MG tablet; Take 1 tablet by mouth Daily.  Dispense: 30 tablet; Refill: 2    2. Major depressive disorder, recurrent episode, moderate  -     ARIPiprazole (Abilify) 2 MG tablet; Take 1 tablet by mouth Daily.  Dispense: 30 tablet; Refill: 2    3. Insomnia due to mental condition        Visit Diagnoses:    ICD-10-CM ICD-9-CM   1. Generalized anxiety disorder  F41.1 300.02   2. Major depressive disorder, recurrent episode, moderate  F33.1 296.32   3. Insomnia due to mental condition  F51.05 300.9     327.02     12/19: Drank for 9  years 87-14, 28 years. Treats insomnia with cannabis. Sober since 2014. Start abilify. Pt reports that compliance migh be an issue. 4w    PLAN:  Safety: No acute safety concerns  Therapy: Currently Seeing a Therapist  Risk Assessment: Risk of self-harm acutely is moderate.  Risk factors include anxiety disorder, mood disorder, some AODA, and recent psychosocial stressors (pandemic). Protective factors include no family history, denies access to guns/weapons, no present SI, no history of suicide attempts or self-harm in the past, minimal AODA, healthcare seeking, future orientation, willingness to engage in care.  Risk of self-harm chronically is also moderate, but could be further elevated in the event of treatment noncompliance and/or AODA.  Meds:  START abilify 2 mg qhs. Risks, benefits, alternatives discussed with patient including increased energy, exacerbation of irritability, akathisia, movement issues, GI upset, orthostatic hypotension, increased appetite, tardive dyskinesia.  Use care when operating vehicle, vessel, or machine. After discussion of these risks and benefits, the patient voiced understanding and agreed to proceed.  Labs: none    Patient screened positive for depression based on a PHQ-9 score of 5 on 12/19/2023. Follow-up recommendations include: Prescribed antidepressant medication treatment and Suicide Risk Assessment performed.           TREATMENT PLAN/GOALS: Continue supportive psychotherapy efforts and medications as indicated. Treatment and medication options discussed during today's visit. Patient acknowledged and verbally consented to continue with current treatment plan and was educated on the importance of compliance with treatment and follow-up appointments.    MEDICATION ISSUES:  LUIS ANGEL reviewed as expected.  Discussed medication options and treatment plan of prescribed medication as well as the risks, benefits, and side effects including potential falls, possible impaired driving  and metabolic adversities among others. Patient is agreeable to call the office with any worsening of symptoms or onset of side effects. Patient is agreeable to call 911 or go to the nearest ER should he/she begin having SI/HI. No medication side effects or related complaints today.     MEDS ORDERED DURING VISIT:  New Medications Ordered This Visit   Medications    ARIPiprazole (Abilify) 2 MG tablet     Sig: Take 1 tablet by mouth Daily.     Dispense:  30 tablet     Refill:  2       Return in about 4 weeks (around 1/16/2024).         This document has been electronically signed by Kely Sloan MD  December 19, 2023 09:31 EST    Dictated Utilizing Dragon Dictation: Part of this note may be an electronic transcription/translation of spoken language to printed text using the Dragon Dictation System.

## 2023-12-18 NOTE — PATIENT INSTRUCTIONS
1.  Please return to clinic at your next scheduled visit.  Contact the clinic ([phone]) at least 24 hours prior in the event you need to cancel.  2.  Do no harm to yourself or others.    3.  Avoid alcohol and drugs.    4.  Take all medications as prescribed.  Please contact the clinic with any concerns. If you are in need of medication refills, please call the clinic at [phone].    5. Should you want to get in touch with your provider, Dr. Kely Sloan, please utilize [place] [place] or contact the office ([phone]), and staff will be able to page Dr. Sloan directly.  6.  In the event you have personal crisis, contact the following crisis numbers: Suicide Prevention Hotline 1-826.120.7449; LISETH Helpline 7-023-398-LISETH; [facility] Emergency Room [phone]; text HELLO to 221504; or 661.

## 2023-12-19 ENCOUNTER — OFFICE VISIT (OUTPATIENT)
Dept: PSYCHIATRY | Facility: CLINIC | Age: 53
End: 2023-12-19
Payer: COMMERCIAL

## 2023-12-19 VITALS
SYSTOLIC BLOOD PRESSURE: 103 MMHG | HEIGHT: 72 IN | WEIGHT: 181.6 LBS | HEART RATE: 74 BPM | DIASTOLIC BLOOD PRESSURE: 67 MMHG | BODY MASS INDEX: 24.6 KG/M2

## 2023-12-19 DIAGNOSIS — F41.1 GENERALIZED ANXIETY DISORDER: Primary | ICD-10-CM

## 2023-12-19 DIAGNOSIS — F33.1 MAJOR DEPRESSIVE DISORDER, RECURRENT EPISODE, MODERATE: ICD-10-CM

## 2023-12-19 DIAGNOSIS — F51.05 INSOMNIA DUE TO MENTAL CONDITION: ICD-10-CM

## 2023-12-19 RX ORDER — ARIPIPRAZOLE 2 MG/1
2 TABLET ORAL DAILY
Qty: 30 TABLET | Refills: 2 | Status: SHIPPED | OUTPATIENT
Start: 2023-12-19

## 2023-12-19 RX ORDER — PREDNISONE 20 MG/1
20 TABLET ORAL DAILY
COMMUNITY
Start: 2023-11-03 | End: 2023-12-19 | Stop reason: SDDI

## 2023-12-19 RX ORDER — AMOXICILLIN 875 MG/1
TABLET, COATED ORAL
COMMUNITY
Start: 2023-11-03 | End: 2023-12-19 | Stop reason: SDDI

## 2023-12-19 NOTE — TREATMENT PLAN
Multi-Disciplinary Problems (from Behavioral Health Treatment Plan)      Active Problems       Problem: Anxiety  Start Date: 12/19/23      Problem Details: The patient self-scales this problem as a 5 with 10 being the worst.          Goal Priority Start Date Expected End Date End Date    Patient will develop and implement behavioral and cognitive strategies to reduce anxiety and irrational fears. -- 12/19/23 -- --    Goal Details: Progress toward goal:  Not appropriate to rate progress toward goal since this is the initial treatment plan.          Goal Intervention Frequency Start Date End Date    Help patient explore past emotional issues in relation to present anxiety. Q Month 12/19/23 --    Intervention Details: Duration of treatment until until remission of symptoms.          Goal Intervention Frequency Start Date End Date    Help patient develop an awareness of their cognitive and physical responses to anxiety. Q Month 12/19/23 --    Intervention Details: Duration of treatment until until remission of symptoms.                  Problem: Depression  Start Date: 12/19/23      Problem Details: The patient self-scales this problem as a 3 with 10 being the worst.          Goal Priority Start Date Expected End Date End Date    Patient will demonstrate the ability to initiate new constructive life skills outside of sessions on a consistent basis. -- 12/19/23 -- --    Goal Details: Progress toward goal:  Not appropriate to rate progress toward goal since this is the initial treatment plan.          Goal Intervention Frequency Start Date End Date    Assist patient in setting attainable activities of daily living goals. PRN 12/19/23 --      Goal Intervention Frequency Start Date End Date    Provide education about depression Q Month 12/19/23 --    Intervention Details: Duration of treatment until until remission of symptoms.          Goal Intervention Frequency Start Date End Date    Assist patient in developing healthy  coping strategies. Q Month 12/19/23 --    Intervention Details: Duration of treatment until until remission of symptoms.                          Reviewed By       Kely Sloan MD 12/19/23 [id number]                     I have discussed and reviewed this treatment plan with the patient.

## 2023-12-26 ENCOUNTER — TELEPHONE (OUTPATIENT)
Dept: PSYCHIATRY | Facility: CLINIC | Age: 53
End: 2023-12-26
Payer: COMMERCIAL

## 2023-12-26 NOTE — TELEPHONE ENCOUNTER
PT(PATIENT) VERIFIED     ARIPiprazole (Abilify) 2 MG tablet (2023)     PT(PATIENT) STATES HE HE STOPPED TAKING THE MEDICATION DUE TO SIDE EFFECTS     PT(PATIENT) STATES HE WAS HAVING INCREASED ANXIETY, HEADACHES, NAUSEA, AND HE WAS UNABLE TO SLEEP WHILE TAKING THE MEDICATION     NEXT APPT WITH PROVIDER  Appointment with Kely Sloan MD (2024)     PROVIDER PLEASE ADVISE

## 2023-12-27 NOTE — TELEPHONE ENCOUNTER
PT(PATIENT) VERIFIED     CONTACTED PT(PATIENT) PER PROVIDER'S INSTRUCTIONS     PT(PATIENT) STATES HE HAS ANOTHER APPT ON THAT DAY, SO HE IS UNABLE TO MAKE THAT APPT TIME     PT(PATIENT) REMINDED OF APPT ON Appointment with Kely Sloan MD (2024)     PT(PATIENT) STATED HE DIDN'T FEEL LIKE HIS ISSUES WERE AN EMERGENCY, SO HE WANTS TO WAIT TIL THE      PROVIDER PLEASE ADVISE

## 2024-02-13 ENCOUNTER — OFFICE VISIT (OUTPATIENT)
Dept: PSYCHIATRY | Facility: CLINIC | Age: 54
End: 2024-02-13
Payer: COMMERCIAL

## 2024-02-13 VITALS
BODY MASS INDEX: 24.35 KG/M2 | DIASTOLIC BLOOD PRESSURE: 73 MMHG | HEIGHT: 72 IN | HEART RATE: 70 BPM | WEIGHT: 179.8 LBS | SYSTOLIC BLOOD PRESSURE: 118 MMHG

## 2024-02-13 DIAGNOSIS — F51.05 INSOMNIA DUE TO MENTAL CONDITION: ICD-10-CM

## 2024-02-13 DIAGNOSIS — F41.1 GENERALIZED ANXIETY DISORDER: Primary | ICD-10-CM

## 2024-02-13 DIAGNOSIS — F33.1 MAJOR DEPRESSIVE DISORDER, RECURRENT EPISODE, MODERATE: ICD-10-CM

## 2024-02-13 PROCEDURE — 99214 OFFICE O/P EST MOD 30 MIN: CPT | Performed by: STUDENT IN AN ORGANIZED HEALTH CARE EDUCATION/TRAINING PROGRAM

## 2024-02-13 PROCEDURE — 90833 PSYTX W PT W E/M 30 MIN: CPT | Performed by: STUDENT IN AN ORGANIZED HEALTH CARE EDUCATION/TRAINING PROGRAM

## 2024-02-13 RX ORDER — HYDROXYZINE HYDROCHLORIDE 25 MG/1
25 TABLET, FILM COATED ORAL DAILY PRN
Qty: 30 TABLET | Refills: 2 | Status: SHIPPED | OUTPATIENT
Start: 2024-02-13

## 2024-03-11 ENCOUNTER — TELEMEDICINE (OUTPATIENT)
Dept: PSYCHIATRY | Facility: CLINIC | Age: 54
End: 2024-03-11
Payer: COMMERCIAL

## 2024-03-11 DIAGNOSIS — F41.1 GENERALIZED ANXIETY DISORDER: Primary | ICD-10-CM

## 2024-03-11 DIAGNOSIS — F33.1 MAJOR DEPRESSIVE DISORDER, RECURRENT EPISODE, MODERATE: ICD-10-CM
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
## 2024-03-20 ENCOUNTER — TELEPHONE (OUTPATIENT)
Dept: PSYCHIATRY | Facility: CLINIC | Age: 54
End: 2024-03-20
Payer: COMMERCIAL

## 2024-03-20 NOTE — TELEPHONE ENCOUNTER
Sent Patient a [employer] message welcoming patient  to our practice and asking for a feedback.

## 2024-04-11 ENCOUNTER — TELEMEDICINE (OUTPATIENT)
Dept: PSYCHIATRY | Facility: CLINIC | Age: 54
End: 2024-04-11
Payer: COMMERCIAL

## 2024-04-11 DIAGNOSIS — F41.1 GENERALIZED ANXIETY DISORDER: Primary | ICD-10-CM

## 2024-04-11 DIAGNOSIS — F33.1 MAJOR DEPRESSIVE DISORDER, RECURRENT EPISODE, MODERATE: ICD-10-CM

## 2024-04-11 NOTE — PROGRESS NOTES
Date: 2024  Time In: 10:00 EDT  Time out: 10:59      This provider is located at [facility], [facility], [place], [place], Formerly named [facility], using a secure MyChart Video Visit through [employer]. Patient is being seen remotely via telehealth at their home address is located in [place]. Patient stated they are in a secure environment for this session. The patient's condition being diagnosed and treated is appropriate for telemedicine. The provider identified themself as well as their credentials. The patient, or  patient's legal guardian consent to be seen remotely, and when consent is given they understand that the consent allows for patient identifiable information to be sent to a third party as needed. They may refuse to be seen remotely at any time. The electronic data is encrypted and password protected, and the patient's or  legal guardian has been advised of the potential risks to privacy not withstanding such measures.   PT Identifiers used: Name and .    You have chosen to receive care through a telehealth visit.  Do you consent to use a video/audio connection for your medical care today? Yes     Subjective   Smooth Tse is a 53 y.o. male who presents today for follow up    Chief Complaint: Anxiety and depression      History of Present Illness: Client discussed how things have been going for him since our last session. He states that he had several difficult projects he was working on for work, and describes being very distressed by this, and states that there were times where he felt unsure of his ability to manage all of it. He states that he is aware that work life balance does not seem to exist for him right now. Client and therapist discussed that he is unsure if can get out of his head, he finds himself over thinking most things.  He identifies that he works too much, and feels that he gets a sense of accomplishment fr om it, he states that he dreads things but once he  accomplishes them he gets a sense of relief as well.  Client discussed that he feels afraid to make changes.  He states that at work others see him as competant and good at what he does, but he identifies that he does not always feel competant.  He discussed others fears such as losing his wife, or his kids.  He states that he will get fixated on these fears and they drive him.  He states that he always fells tense and wound up. Any type of changes, positive or negative results in him feeling stressed or overwhelmed      Clinical Maneuvering/Intervention:  On a scale of 0-10 ( with 10 being worst)  Anxiety:  4 or 5/10 currently ( he states that situationally it can go up higher for him)  Depression: 4 or 5/10 ( he reports that just like the anxiety level went up he felt he depression level going up as well)  Sleep:  client denies any current problems  Appetite: Reports that his appetite has not been good over the last few months.  He states that he will go all day without eating until about 6 pm.  He states that this will happen several times a week.         Assisted patient in processing above session content; acknowledged and normalized patient’s thoughts, feelings, and concerns.  Rationalized patient thought process regarding concerns presented at session.  Discussed triggers associated with patient's  anxiety  and depression  Also discussed coping skills for patient to implement such as mindfulness , self care , and positive self talk     Allowed patient to freely discuss issues without interruption or judgment. Provided safe, confidential environment to facilitate the development of positive therapeutic relationship and encourage open, honest communication. Assisted patient in identifying risk factors which would indicate the need for higher level of care including thoughts to harm self or others and/or self-harming behavior and encouraged patient to contact this office, call 911, or present to the nearest  emergency room should any of these events occur. Discussed crisis intervention services and means to access. Patient adamantly and convincingly denies current suicidal or homicidal ideation or perceptual disturbance.    Assessment:     Patient appears to maintain relative stability as compared to their baseline.  However, patient continues to struggle with anxiety and depression which continues to cause impairment in important areas of functioning.  A result, they can be reasonably expected to continue to benefit from treatment and would likely be at increased risk for decompensation otherwise.      PHQ-Score Total:  PHQ-9 Total Score:      SAUD-7 Score Total:         Mental Status Exam:   Hygiene:   good  Cooperation:  Cooperative  Eye Contact:  Good  Psychomotor Behavior:  Appropriate  Affect:  Appropriate  Mood: sad, depressed, and anxious  Speech:  Normal  Thought Process:  Goal directed and Linear  Thought Content:  Mood congruent  Suicidal:  None  Homicidal:  None  Hallucinations:  None  Delusion:  None  Memory:   No changes   Orientation:  Person, Place, Time, and Situation  Reliability:  fair  Insight:  Fair  Judgement:  Fair  Impulse Control:  Fair  Physical/Medical Issues:   No changes since last session         Patient's Support Network Includes:  wife and extended family    Functional Status: Moderate impairment     Progress toward goal: Not at goal    Prognosis: Fair with Ongoing Treatment         Plan:    Patient will continue in individual outpatient therapy with focus on improved functioning and coping skills, maintaining stability, and avoiding decompensation and the need for higher level of care.    Patient will adhere to medication regimen as prescribed and report any side effects. Patient will contact this office, call 911 or present to the nearest emergency room should suicidal or homicidal ideations occur. Provide Cognitive Behavioral Therapy and Solution Focused Therapy to improve functioning,  maintain stability, and avoid decompensation and the need for higher level of care.     Return in about 2 weeks (around 4/25/2024).      VISIT DIAGNOSIS:    Diagnosis Plan   1. Generalized anxiety disorder        2. Major depressive disorder, recurrent episode, moderate               This document has been electronically signed by Louise Castellon LCSW  April 11, 2024      Part of this note may be an electronic transcription/translation of spoken language to printed text using the Dragon Dictation System.

## 2024-04-22 ENCOUNTER — TELEMEDICINE (OUTPATIENT)
Dept: PSYCHIATRY | Facility: CLINIC | Age: 54
End: 2024-04-22
Payer: COMMERCIAL

## 2024-04-22 DIAGNOSIS — F33.1 MAJOR DEPRESSIVE DISORDER, RECURRENT EPISODE, MODERATE: ICD-10-CM

## 2024-04-22 DIAGNOSIS — F41.1 GENERALIZED ANXIETY DISORDER: Primary | ICD-10-CM

## 2024-04-22 PROCEDURE — 90834 PSYTX W PT 45 MINUTES: CPT | Performed by: SOCIAL WORKER

## 2024-04-22 NOTE — PROGRESS NOTES
"Date: 2024  Time In: 10:03 EDT  Time out: 10:55 EDT      This provider is located at [facility], [address], [place], [facility], using a secure MyChart Video Visit through [employer]. Patient is being seen remotely via telehealth at their home address is located in [place]. Patient stated they are in a secure environment for this session. The patient's condition being diagnosed and treated is appropriate for telemedicine. The provider identified themself as well as their credentials. The patient, or  patient's legal guardian consent to be seen remotely, and when consent is given they understand that the consent allows for patient identifiable information to be sent to a third party as needed. They may refuse to be seen remotely at any time. The electronic data is encrypted and password protected, and the patient's or  legal guardian has been advised of the potential risks to privacy not withstanding such measures.   PT Identifiers used: Name and .    You have chosen to receive care through a telehealth visit.  Do you consent to use a video/audio connection for your medical care today? Yes     Subjective   Smooth Tse is a 53 y.o. male who presents today for follow up    Chief Complaint: Anxiety and depression      History of Present Illness: Client discussed how things have been since last session.  He states that since last session work has slowed down and this has helped with his anxiety.  He states that his spouse is currently running for political office for the first time. Client and therapy discussed the \"worry paradox\" and the impact of that within his own life.  Client states that he worry about things over and over and gave examples of this. He also identifies that he has muscle aches in his back and neck from the tension. He states that he can feel that tension increase anytime he has a medical or therapy appointments. He states that his anxiety makes his " "restless, he describes always moving his feet or playing with fingers.he also identifies times that his anxiety has negatively impacted relationships and social activities.He described a couple of occasions that this has caused problems for him. He reports that his friends and family have told him many time that he worries too much.  He states that the morning are a feeling of dreadful for him.  He states that the days are just dealing with the feelings that come in.He also identified that the anxiety causes him problems with focus and concentration and shared how this can cause problems for him at work and in other environments as well. He described some signs of social anxiety, and described feeling as though he has \"imposter syndrome\".  He states that his anxiety can cause him to be very irritable and take  it out on others in his life. He feels that the biggest aspect that worrying/anxiety interferes with his is relationships with his family.      Clinical Maneuvering/Intervention:    On a scale 0-10 ( with 10 being the worst)  Anxiety: 4/10  Depression: 3/10    Sleep: No current concerns with sleep    Appetite:  client reports that he has been working a great deal, and reports that there are times when he will not eat until about 10 pm      Assisted patient in processing above session content; acknowledged and normalized patient’s thoughts, feelings, and concerns.  Rationalized patient thought process regarding concerns presented at session.  Discussed triggers associated with patient's  anxiety  and depression  Also discussed coping skills for patient to implement such as  discussed worry paradox    Allowed patient to freely discuss issues without interruption or judgment. Provided safe, confidential environment to facilitate the development of positive therapeutic relationship and encourage open, honest communication. Assisted patient in identifying risk factors which would indicate the need for higher level " of care including thoughts to harm self or others and/or self-harming behavior and encouraged patient to contact this office, call 911, or present to the nearest emergency room should any of these events occur. Discussed crisis intervention services and means to access. Patient adamantly and convincingly denies current suicidal or homicidal ideation or perceptual disturbance.    Assessment:     Patient appears to maintain relative stability as compared to their baseline.  However, patient continues to struggle with anxiety and depression  which continues to cause impairment in important areas of functioning.  A result, they can be reasonably expected to continue to benefit from treatment and would likely be at increased risk for decompensation otherwise.      PHQ-Score Total:  PHQ-9 Total Score:      SAUD-7 Score Total:         Mental Status Exam:   Hygiene:   good  Cooperation:  Cooperative  Eye Contact:  Good  Psychomotor Behavior:  Appropriate  Affect:  Appropriate  Mood: depressed and anxious  Speech:  Normal  Thought Process:  Goal directed and Linear  Thought Content:  Mood congruent  Suicidal:  None  Homicidal:  None  Hallucinations:  None  Delusion:  None  Memory:  Intact  Orientation:  Person, Place, Time, and Situation  Reliability:  good  Insight:  Good  Judgement:  Fair  Impulse Control:  Fair  Physical/Medical Issues:   no current changes reported        Patient's Support Network Includes:  wife, children, and extended family    Functional Status: Moderate impairment     Progress toward goal: Not at goal    Prognosis: Fair with Ongoing Treatment         Plan:    Patient will continue in individual outpatient therapy with focus on improved functioning and coping skills, maintaining stability, and avoiding decompensation and the need for higher level of care.    Patient will adhere to medication regimen as prescribed and report any side effects. Patient will contact this office, call 911 or present to the  nearest emergency room should suicidal or homicidal ideations occur. Provide Cognitive Behavioral Therapy and Solution Focused Therapy to improve functioning, maintain stability, and avoid decompensation and the need for higher level of care.     Return in about 3 weeks (around 5/13/2024).      VISIT DIAGNOSIS:    Diagnosis Plan   1. Generalized anxiety disorder        2. Major depressive disorder, recurrent episode, moderate               This document has been electronically signed by Louise Castellon LCSW  April 22, 2024

## 2024-04-29 ENCOUNTER — TELEMEDICINE (OUTPATIENT)
Dept: PSYCHIATRY | Facility: CLINIC | Age: 54
End: 2024-04-29
Payer: COMMERCIAL

## 2024-04-29 DIAGNOSIS — F41.1 GENERALIZED ANXIETY DISORDER: Primary | ICD-10-CM

## 2024-04-29 DIAGNOSIS — F33.1 MAJOR DEPRESSIVE DISORDER, RECURRENT EPISODE, MODERATE: ICD-10-CM

## 2024-04-29 PROCEDURE — 90832 PSYTX W PT 30 MINUTES: CPT | Performed by: SOCIAL WORKER

## 2024-04-29 NOTE — PROGRESS NOTES
"Date: 2024  Time In: 09:00 EDT  Time out: 09:35 EDT      This provider is located at [facility], [facility], [place], [facility], using a secure MyChart Video Visit through [employer]. Patient is being seen remotely via telehealth at their home address is located in [place]. Patient stated they are in a secure environment for this session. The patient's condition being diagnosed and treated is appropriate for telemedicine. The provider identified themself as well as their credentials. The patient, or  patient's legal guardian consent to be seen remotely, and when consent is given they understand that the consent allows for patient identifiable information to be sent to a third party as needed. They may refuse to be seen remotely at any time. The electronic data is encrypted and password protected, and the patient's or  legal guardian has been advised of the potential risks to privacy not withstanding such measures.   PT Identifiers used: Name and .    You have chosen to receive care through a telehealth visit.  Do you consent to use a video/audio connection for your medical care today? Yes     Subjective   Smooth Tse is a 53 y.o. male who presents today for follow up    Chief Complaint: Anxiety, depression      History of Present Illness: Client discussed how things have been since our last session. Client and therapist discussed probable versus possible when it comes to anxiety and worry.  Client feels that he \"prepares's himself.  Explored preparing himself versus self sabotage with client, he prefers to view it as \"self preservation\"    Clinical Maneuvering/Intervention:    On a scale 0-10 ( with 10 being the worst)  Anxiety: 4/10  Depression: 3 or 4/10    Sleep: No current concerns with sleep    Appetite: No current eating issues      Assisted patient in processing above session content; acknowledged and normalized patient’s thoughts, feelings, and concerns.  " Rationalized patient thought process regarding concerns presented at session.  Discussed triggers associated with patient's  anxiety  and depression  Also discussed coping skills for patient to implement such as  identification of probable versus possible    Allowed patient to freely discuss issues without interruption or judgment. Provided safe, confidential environment to facilitate the development of positive therapeutic relationship and encourage open, honest communication. Assisted patient in identifying risk factors which would indicate the need for higher level of care including thoughts to harm self or others and/or self-harming behavior and encouraged patient to contact this office, call 911, or present to the nearest emergency room should any of these events occur. Discussed crisis intervention services and means to access. Patient adamantly and convincingly denies current suicidal or homicidal ideation or perceptual disturbance.    Assessment:     Patient appears to maintain relative stability as compared to their baseline.  However, patient continues to struggle with anxiety and depression  which continues to cause impairment in important areas of functioning.  A result, they can be reasonably expected to continue to benefit from treatment and would likely be at increased risk for decompensation otherwise.      PHQ-Score Total:  PHQ-9 Total Score:      SAUD-7 Score Total:         Mental Status Exam:   Hygiene:   good  Cooperation:  Cooperative  Eye Contact:  Good  Psychomotor Behavior:  Appropriate  Affect:  Full range  Mood: sad, depressed, and anxious  Speech:  Normal  Thought Process:  Goal directed  Thought Content:  Mood congruent  Suicidal:  None  Homicidal:  None  Hallucinations:  None  Delusion:  None  Memory:  Intact  Orientation:  Person, Place, Time, and Situation  Reliability:  fair  Insight:  Fair  Judgement:  Fair  Impulse Control:  Good  Physical/Medical Issues:   no changes reported         Patient's Support Network Includes:  wife and extended family    Functional Status: Moderate impairment     Progress toward goal: Not at goal    Prognosis: Fair with Ongoing Treatment         Plan:    Patient will continue in individual outpatient therapy with focus on improved functioning and coping skills, maintaining stability, and avoiding decompensation and the need for higher level of care.    Patient will adhere to medication regimen as prescribed and report any side effects. Patient will contact this office, call 911 or present to the nearest emergency room should suicidal or homicidal ideations occur. Provide Cognitive Behavioral Therapy and Solution Focused Therapy to improve functioning, maintain stability, and avoid decompensation and the need for higher level of care.     Return in about 2 weeks (around 5/13/2024).      VISIT DIAGNOSIS:    Diagnosis Plan   1. Generalized anxiety disorder        2. Major depressive disorder, recurrent episode, moderate               This document has been electronically signed by Louise Castellon LCSW  April 29, 2024      Part of this note may be an electronic transcription/translation of spoken language to printed text using the Dragon Dictation System.

## 2024-05-09 ENCOUNTER — TELEPHONE (OUTPATIENT)
Dept: PSYCHIATRY | Facility: CLINIC | Age: 54
End: 2024-05-09

## 2024-05-09 ENCOUNTER — TELEMEDICINE (OUTPATIENT)
Dept: PSYCHIATRY | Facility: CLINIC | Age: 54
End: 2024-05-09
Payer: COMMERCIAL

## 2024-05-09 DIAGNOSIS — F33.1 MAJOR DEPRESSIVE DISORDER, RECURRENT EPISODE, MODERATE: ICD-10-CM

## 2024-05-09 DIAGNOSIS — F41.1 GENERALIZED ANXIETY DISORDER: Primary | ICD-10-CM

## 2024-05-17 ENCOUNTER — OFFICE VISIT (OUTPATIENT)
Dept: PSYCHIATRY | Facility: CLINIC | Age: 54
End: 2024-05-17
Payer: COMMERCIAL

## 2024-05-17 VITALS
BODY MASS INDEX: 22.89 KG/M2 | HEIGHT: 72 IN | HEART RATE: 68 BPM | DIASTOLIC BLOOD PRESSURE: 75 MMHG | SYSTOLIC BLOOD PRESSURE: 119 MMHG | WEIGHT: 169 LBS

## 2024-05-17 DIAGNOSIS — F33.1 MAJOR DEPRESSIVE DISORDER, RECURRENT EPISODE, MODERATE: ICD-10-CM

## 2024-05-17 DIAGNOSIS — F41.1 GENERALIZED ANXIETY DISORDER: Primary | ICD-10-CM

## 2024-05-17 DIAGNOSIS — F51.05 INSOMNIA DUE TO MENTAL CONDITION: ICD-10-CM

## 2024-05-17 NOTE — PATIENT INSTRUCTIONS
1.  Please return to clinic at your next scheduled visit.  Contact the clinic ([phone]) at least 24 hours prior in the event you need to cancel.  2.  Do no harm to yourself or others.    3.  Avoid alcohol and drugs.    4.  Take all medications as prescribed.  Please contact the clinic with any concerns. If you are in need of medication refills, please call the clinic at [phone].    5. Should you want to get in touch with your provider, Dr. Kely Sloan, please utilize [employer] or contact the office ([phone]), and staff will be able to page Dr. Sloan directly.  6.  In the event you have personal crisis, contact the following crisis numbers: Suicide Prevention Hotline 1-914.154.7302; LISETH Helpline 3-397-824-LISETH; [facility] Emergency Room [phone]; text HELLO to 702975; or 226.

## 2024-05-17 NOTE — PROGRESS NOTES
"Subjective   Smooth Tse is a 53 y.o. male who presents today for initial evaluation     Referring Provider:  No referring provider defined for this encounter.    Chief Complaint:  SAUD    History of Present Illness:     23: Initial Chart review:     Kofi: blank  Care Everywhere: none    Psychotropic medication chart review:  Present:  none    Previously:  Ativan  Trintellix 10 mg qday    EKG: none  Procedures: none  Head imaging: none  Labs: gluc 118 on cmp, reassuring thyroid studies, cbc. High ldl.    Initial Chart notes: Seen  by PCP. Wanted a referral to psych.      \"Maynor\"  Patient Psychotherapy Notes:  Patient goals:  Misc:      Chart review:   24: teletherapy x5.  : no visits. Pt didn't tolerate the abilify (took it for a few days).    Visits (Below):    2024: In person interview:  Plannin/13: Start hydroxyzine PRN for intermittent anger, per pts wishes. Referral to [facility] for therapy related to anger that occcurs a few times a year.  : Drank for 9 years -,  years. Treats insomnia with cannabis. Sober since . Start abilify. Pt reports that compliance migh be an issue. 4w  \"It's going really well.\"  Likes the therapy, doing DBT  More peace.  Rarely uses hydroxyzine  It's been 8 mos since I had a blowup  Mood/Depression: stable MDD  Anxiety: stable SAUD   Panic attacks: n  Energy: stable  Concentration: stable  Sleeping: stable insomnia   Eatin, 179 lbs  Refills: y  Substances: def  Therapy: was Dr. Romero, now [facility]  Medication compliant: y  SE: n  No SI HI AVH.      : In person interview:  Chart review:   : no visits. Pt didn't tolerate the abilify (took it for a few days).  Plannin/19: Drank for 9 years -, 27 years. Treats insomnia with cannabis. Sober since . Start abilify. Pt reports that compliance migh be an issue. 4w  \"It intensified my anxiety and I felt out of it.\"  I'm not going to give things weeks and weeks to kick " "in.  Medication and me usually don't get along  Wellbutrin worked for a while, and then \"it got crumply in my brain... like plastic.\"  It was better for depression  But it increased the anxiety  It builds... then I explode, and then after I explode... I feel awesome for a while.  Mood/Depression: stable MDD  Anxiety: stable SAUD, except about 3-4x a year  Panic attacks: n  Energy: stable  Concentration: stable  Sleeping: stable insomnia   Eatin lbs  Refills: y  Substances: cannabis in the evenings  Therapy: n  Medication compliant: y  SE: n  No SI HI AVH.        : In person.  Interview:  Chart review:   His/Her Story: \"My main gist of being here is 10 min every 3 or 4 months.\"  P5, G11  That's when bad Maynor comes out  Most of the time I'm pretty laid back but things can slowly build over several months, then sometimes I can yell at the top of my lungs, or throw things.  I've hit a wall before.  Then it's over and I'm fine... but it builds again.  Last episode was September.  I also have \"depressive episodes\".  Going on my whole life. 9 yo that's when I felt \"this darkness come over me.\"  I saw my Mom weep during her episodes (maybe 15 min). Usually happy.  Her brother recently . He was known to have his sad times.  Growing up it was kindof cold, even though it was loving. You couldn't be really expressive.  I was living in a fantasy world to escape to a more exciting world  I used to lie, forge signatures for school  I drank at 16 yo, \"I needed alcohol.\" It turned me into this outgoing and fun charu.  Then drugs. I loved cocaine.  Depression/Mood: A little depressed   Depressed mood  Seasonal pattern: yes  Severity: Moderate  Duration: On and off for years since 9 yo  Anxiety:  Uncontrolled worrying, muscle tension, fatigue,  feeling on edge or restless, irritability, insomnia.  Severity: Moderate  Sense of doom  Duration: On and off for years since 9 yo  Panic attacks:  Psych ROS: Positive for " depression, anxiety.  Negative for psychosis but positive for hypomania.  ADHD: def  PTSD: def  No SI HI AVH.  Medication compliant: na  Has a therapist for 10 years. Nick.    Access to Firearms: denies    PHQ-9 Depression Screening  PHQ-9 Total Score:      Little interest or pleasure in doing things?     Feeling down, depressed, or hopeless?     Trouble falling or staying asleep, or sleeping too much?     Feeling tired or having little energy?     Poor appetite or overeating?     Feeling bad about yourself - or that you are a failure or have let yourself or your family down?     Trouble concentrating on things, such as reading the newspaper or watching television?     Moving or speaking so slowly that other people could have noticed? Or the opposite - being so fidgety or restless that you have been moving around a lot more than usual?     Thoughts that you would be better off dead, or of hurting yourself in some way?     PHQ-9 Total Score       SAUD-7       Past Surgical History:  Past Surgical History:   Procedure Laterality Date    VASECTOMY         Problem List:  Patient Active Problem List   Diagnosis    Anxiety disorder    Stress    Lipid screening       Allergy:   Allergies   Allergen Reactions    Ciprofloxacin Mental Status Change        Discontinued Medications:  Medications Discontinued During This Encounter   Medication Reason    ARIPiprazole (Abilify) 2 MG tablet Duplicate order    hydrOXYzine (ATARAX) 25 MG tablet Duplicate order         Current Medications:   No current outpatient medications on file.     No current facility-administered medications for this visit.       Past Medical History:  Past Medical History:   Diagnosis Date    Anxiety     Depression        Past Psychiatric History:  Began Treatment: 15 years ago  Diagnoses: depression  Psychiatrist: multiple  Therapist: once a month  Admission History:Denies  Medication Trials:    Many    Pristiq, others    Wellbutrin: I got so spaced  "out.    Never put on a mood stabilizer    Self Harm: Denies  Suicide Attempts:Denies      Substance Abuse History:   Types: cannabis  Withdrawal Symptoms:Denies  Longest Period Sober:Not Applicable   AA: Not applicable     Social History:  Martial Status:  Employed:Yes  Kids:Yes  House:Lives in a house   History: Denies    Social History     Socioeconomic History    Marital status:    Tobacco Use    Smoking status: Never    Smokeless tobacco: Never   Vaping Use    Vaping status: Former   Substance and Sexual Activity    Alcohol use: Not Currently    Drug use: Yes     Types: Marijuana    Sexual activity: Yes     Partners: Female     Birth control/protection: Condom, Vasectomy       Family History: (1st degree)  Suicide Attempts: Denies  Suicide Completions:Denies      Family History   Problem Relation Age of Onset    Depression Mother     Cancer Mother     Anxiety disorder Mother     Depression Father     Cancer Father     Anxiety disorder Father        Developmental History:       Childhood: Denies Abuse, but my parents were older, Mom had me when she was 40, dad was 46  High School:Completed  College: 5 yr, no degree    Mental Status Exam  Appearance  : groomed, good eye contact, normal street clothes  Behavior  : pleasant and cooperative  Motor  : No abnormal  Speech  :normal rhythm, rate, volume, tone, not hyperverbal, not pressured, normal prosidy  Mood  : \"I'm doing well\"  Affect  : euthymic, mood congruent, good variability  Thought Content  : negative suicidal ideations, negative homicidal ideations, negative obsessions  Perceptions  : negative auditory hallucinations, negative visual hallucinations  Thought Process  : linear  Insight/Judgement  : Fair/fair  Cognition  : grossly intact  Attention   : intact      Review of Systems:  Review of Systems   Constitutional:  Negative for diaphoresis and fatigue.   HENT:  Negative for drooling.    Eyes:  Negative for visual disturbance. " "  Respiratory:  Negative for cough and shortness of breath.    Cardiovascular:  Negative for chest pain, palpitations and leg swelling.   Gastrointestinal:  Negative for nausea and vomiting.   Endocrine: Negative for cold intolerance and heat intolerance.   Genitourinary:  Negative for difficulty urinating.   Musculoskeletal:  Negative for joint swelling.   Allergic/Immunologic: Negative for immunocompromised state.   Neurological:  Negative for dizziness, seizures, speech difficulty and numbness.   Psychiatric/Behavioral:  Negative for confusion.        Physical Exam:  Physical Exam    Vital Signs:   /75   Pulse 68   Ht 182.9 cm (72.01\")   Wt 76.7 kg (169 lb)   BMI 22.92 kg/m²      Lab Results:   No visits with results within 6 Month(s) from this visit.   Latest known visit with results is:   Office Visit on 09/25/2023   Component Date Value Ref Range Status    Chlamydia trachomatis, RANDALL 09/25/2023 Negative  Negative Final    Gonococcus by RANDALL 09/25/2023 Negative  Negative Final    Trichomonas vaginosis 09/25/2023 Negative  Negative Final    Hepatitis B Surface Ag 09/25/2023 Non-Reactive  Non-Reactive Final    Hep A IgM 09/25/2023 Non-Reactive  Non-Reactive Final    Hep B C IgM 09/25/2023 Non-Reactive  Non-Reactive Final    Hepatitis C Ab 09/25/2023 Non-Reactive  Non-Reactive Final    HIV DUO 09/25/2023 Non-Reactive  Non-Reactive Final    HSV 1 IgG, Type Specific 09/25/2023 24.60 (H)  0.00 - 0.90 index Final                                     Negative        <0.91                                   Equivocal 0.91 - 1.09                                   Positive        >1.09   Note: Negative indicates no antibodies detected to   HSV-1. Equivocal may suggest early infection.  If   clinically appropriate, retest at later date. Positive   indicates antibodies detected to HSV-1.    HSV 2 IgG, Type Spec 09/25/2023 <0.91  0.00 - 0.90 index Final                                     Negative        <0.91          "                          Equivocal 0.91 - 1.09                                   Positive        >1.09   HSV-2 Antibody Interpretation: Negative indicates no   detectable antibodies to HSV-2 were found. If recent   exposure is suspected, retest in 4-6 weeks. Equivocal   samples should be retested in 4-6 weeks. Positive   indicates the presence of detectable IgG antibody to   HSV-2. False positive results may occur. Repeat   testing, or testing by a different method, may be   indicated in some settings (e.g. patients with low   likelihood of HSV infection). If clinically   appropriate, retest 4-6 weeks later.    RPR 09/25/2023 Non-Reactive  Non-Reactive Final    Hepatitis C Quantitation 09/25/2023 HCV Not Detected  IU/mL Final    Test Information 09/25/2023 Comment   Final    The quantitative range of this assay is 15 IU/mL to 100 million IU/mL.       EKG Results:  No orders to display       Imaging Results:  No Images in the past 120 days found..      Assessment & Plan   Diagnoses and all orders for this visit:    1. Generalized anxiety disorder (Primary)    2. Major depressive disorder, recurrent episode, moderate    3. Insomnia due to mental condition        Visit Diagnoses:    ICD-10-CM ICD-9-CM   1. Generalized anxiety disorder  F41.1 300.02   2. Major depressive disorder, recurrent episode, moderate  F33.1 296.32   3. Insomnia due to mental condition  F51.05 300.9     327.02     5/17/24: Stable, well, no changes.    Allowed patient to freely discuss and process issues, such as:  Anxiety, rare, related to work and home stress.  ... using Rogerian psychotherapeutic techniques including unconditional positive regard, reflective listening, and demonstrating clear empathy, with the goal of ameliorating symptoms and maintaining, restoring, or improving self-esteem, adaptive skills, and ego or psychological functions (John et al. 1991).  Time (minutes) spent providing supportive psychotherapy: 16  (This time is  exclusive to the therapy session and separate from the time spent on activities used to meet the criteria for the E/M service (history, exam, medical decision-making).)  Start: 11:09  Stop: 11:25  Functional status: mild impairment  Treatment plan: Medication management and supportive psychotherapy  Prognosis: good  Progress: stable  PRN    2/13: Start hydroxyzine PRN for intermittent anger, per pts wishes. Referral to [facility] for therapy related to anger that occcurs a few times a year.    12/19: Drank for 9 years 87-14, 27 years. Treats insomnia with cannabis. Sober since 2014. Start abilify. Pt reports that compliance migh be an issue. 4w    PLAN:  Safety: No acute safety concerns  Therapy: Currently Seeing a Therapist  Risk Assessment: Risk of self-harm acutely is moderate.  Risk factors include anxiety disorder, mood disorder, some AODA, and recent psychosocial stressors (pandemic). Protective factors include no family history, denies access to guns/weapons, no present SI, no history of suicide attempts or self-harm in the past, minimal AODA, healthcare seeking, future orientation, willingness to engage in care.  Risk of self-harm chronically is also moderate, but could be further elevated in the event of treatment noncompliance and/or AODA.  Meds:  STOP abilify 2 mg qhs. No help 5/24  CONTINUE hydroxyzine 25 mg daily prn anxiety. Risks, benefits, alternatives discussed with patient including sedation, dizziness, fall risk, GI upset, and risk of increased CNS depression and elevated heart rate if taken with other antihistamines.  Use care when operating vehicle, vessel, or machine. After discussion of these risks and benefits, the patient voiced understanding and agreed to proceed.  Labs: none    Patient screened positive for depression based on a PHQ-9 score of 3 on 3/4/2024. Follow-up recommendations include: Prescribed antidepressant medication treatment and Suicide Risk Assessment performed.            TREATMENT PLAN/GOALS: Continue supportive psychotherapy efforts and medications as indicated. Treatment and medication options discussed during today's visit. Patient acknowledged and verbally consented to continue with current treatment plan and was educated on the importance of compliance with treatment and follow-up appointments.    MEDICATION ISSUES:  LUIS ANGEL reviewed as expected.  Discussed medication options and treatment plan of prescribed medication as well as the risks, benefits, and side effects including potential falls, possible impaired driving and metabolic adversities among others. Patient is agreeable to call the office with any worsening of symptoms or onset of side effects. Patient is agreeable to call 911 or go to the nearest ER should he/she begin having SI/HI. No medication side effects or related complaints today.     MEDS ORDERED DURING VISIT:  No orders of the defined types were placed in this encounter.      Return if symptoms worsen or fail to improve.         This document has been electronically signed by Kely Sloan MD  May 17, 2024 11:26 EDT    Dictated Utilizing Dragon Dictation: Part of this note may be an electronic transcription/translation of spoken language to printed text using the Dragon Dictation System.    Answers submitted by the patient for this visit:  Primary Reason for Visit (Submitted on 5/16/2024)  What is the primary reason for your visit?: Anxiety  Anxiety (Submitted on 5/16/2024)  Chief Complaint: Anxiety  Visit: follow-up  Frequency: constantly  Severity: moderate  depressed mood: No  dry mouth: No  excessive worry: No  insomnia: No  irritability: No  malaise/fatigue: No  obsessions: No  Sleep quality: good  Hours of sleep per night: 8 Hours  Aggravated by: work stress  Medication compliance: 0-25%

## 2024-05-23 ENCOUNTER — TELEMEDICINE (OUTPATIENT)
Dept: PSYCHIATRY | Facility: CLINIC | Age: 54
End: 2024-05-23
Payer: COMMERCIAL

## 2024-05-23 DIAGNOSIS — F41.1 GENERALIZED ANXIETY DISORDER: Primary | ICD-10-CM

## 2024-05-23 DIAGNOSIS — F33.1 MAJOR DEPRESSIVE DISORDER, RECURRENT EPISODE, MODERATE: ICD-10-CM

## 2024-05-23 NOTE — PROGRESS NOTES
"Date: May 23, 2024  Time In: 09:00 EDT  Time out: 09:50 EDT      This provider is located at [facility], [facility] [place], [place], [facility], using a secure [employer] Video Visit through [employer]. Patient is being seen remotely via telehealth at their home address is located in [place]. Patient stated they are in a secure environment for this session. The patient's condition being diagnosed and treated is appropriate for telemedicine. The provider identified themself as well as their credentials. The patient, or  patient's legal guardian consent to be seen remotely, and when consent is given they understand that the consent allows for patient identifiable information to be sent to a third party as needed. They may refuse to be seen remotely at any time. The electronic data is encrypted and password protected, and the patient's or  legal guardian has been advised of the potential risks to privacy not withstanding such measures.   PT Identifiers used: Name and .    You have chosen to receive care through a telehealth visit.  Do you consent to use a video/audio connection for your medical care today? Yes     Subjective   Smooth Tse is a 53 y.o. male who presents today for follow up    Chief Complaint: Anxiety and Depression      History of Present Illness: Client presents today distressed. Verbalizes that he had felt that things at home would be more peaceful after the recent election.  However, things have been more difficult at home.  He expressed that some negative things have happened as a result of the election, which have caused stress between he and spouse, and have caused him to feel that his family is at risk.  He expresses desire to \"get away\" from the distress.  Client adamantly denies thoughts of self harm, but states that he feels overwhelmed by these things. Client expressed that he has to be away for work the next two days and does not feel that he can give his full " "focus to work.  Feels that if he does not work that he is letting family down and not \"providing\", but feels that if he goes to work and is not there to keep family safe from threat that he is also lletting them down.  Client not willing to call into work, stating that he has to much to do not to go.  Client again asked if he felt he could keep self safe, and client adamant that he was not experiencing thoughts of suicide or self harm, just very anxious and upset by current issues      Clinical Maneuvering/Intervention:    On a scale 0-10 ( with 10 being the worst)  Anxiety: 7/10  Depression: 7/10    Sleep:  did not sleep well since election due to anxiety and worry    Appetite: No current eating issues      Assisted patient in processing above session content; acknowledged and normalized patient’s thoughts, feelings, and concerns.  Rationalized patient thought process regarding concerns presented at session.  Discussed triggers associated with patient's  anxiety  and depression  Also discussed coping skills for patient to implement such as  processing current stressors    Allowed patient to freely discuss issues without interruption or judgment. Provided safe, confidential environment to facilitate the development of positive therapeutic relationship and encourage open, honest communication. Assisted patient in identifying risk factors which would indicate the need for higher level of care including thoughts to harm self or others and/or self-harming behavior and encouraged patient to contact this office, call 911, or present to the nearest emergency room should any of these events occur. Discussed crisis intervention services and means to access. Patient adamantly and convincingly denies current suicidal or homicidal ideation or perceptual disturbance.    Assessment:     Patient appears to maintain relative stability as compared to their baseline.  However, patient continues to struggle with anxiety and depression "  which continues to cause impairment in important areas of functioning.  A result, they can be reasonably expected to continue to benefit from treatment and would likely be at increased risk for decompensation otherwise.      PHQ-Score Total:  PHQ-9 Total Score:      SAUD-7 Score Total:         Mental Status Exam:   Hygiene:   good  Cooperation:  Guarded  Eye Contact:  Fair  Psychomotor Behavior:  Aggitated  Affect:  Restricted  Mood: sad, depressed, anxious, panicky, and irritable  Speech:  Normal  Thought Process:  Goal directed  Thought Content:  Mood congruent  Suicidal:  None  Homicidal:  None  Hallucinations:  None  Delusion:  None  Memory:   no current changes  Orientation:  Person, Place, Time, and Situation  Reliability:  good  Insight:  Fair  Judgement:  Fair  Impulse Control:  Fair  Physical/Medical Issues:   no current changes reported        Patient's Support Network Includes:  wife, children, and extended family    Functional Status: Moderate impairment     Progress toward goal: Not at goal    Prognosis: Fair with Ongoing Treatment         Plan:    Patient will continue in individual outpatient therapy with focus on improved functioning and coping skills, maintaining stability, and avoiding decompensation and the need for higher level of care.    Patient will adhere to medication regimen as prescribed and report any side effects. Patient will contact this office, call 911 or present to the nearest emergency room should suicidal or homicidal ideations occur. Provide Cognitive Behavioral Therapy and Solution Focused Therapy to improve functioning, maintain stability, and avoid decompensation and the need for higher level of care.     Return in about 2 weeks (around 6/6/2024).      VISIT DIAGNOSIS:    Diagnosis Plan   1. Generalized anxiety disorder        2. Major depressive disorder, recurrent episode, moderate               This document has been electronically signed by Louise Castellon LCSW  May 23,  2024      Part of this note may be an electronic transcription/translation of spoken language to printed text using the Dragon Dictation System.

## 2024-06-04 ENCOUNTER — TELEMEDICINE (OUTPATIENT)
Dept: PSYCHIATRY | Facility: CLINIC | Age: 54
End: 2024-06-04
Payer: COMMERCIAL

## 2024-06-04 DIAGNOSIS — F41.1 GENERALIZED ANXIETY DISORDER: Primary | ICD-10-CM

## 2024-06-04 DIAGNOSIS — F33.1 MAJOR DEPRESSIVE DISORDER, RECURRENT EPISODE, MODERATE: ICD-10-CM

## 2024-06-04 PROCEDURE — 90837 PSYTX W PT 60 MINUTES: CPT | Performed by: SOCIAL WORKER
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
## 2024-07-01 ENCOUNTER — TELEMEDICINE (OUTPATIENT)
Dept: PSYCHIATRY | Facility: CLINIC | Age: 54
End: 2024-07-01
Payer: COMMERCIAL

## 2024-07-01 DIAGNOSIS — F33.1 MAJOR DEPRESSIVE DISORDER, RECURRENT EPISODE, MODERATE: ICD-10-CM

## 2024-07-01 DIAGNOSIS — F41.1 GENERALIZED ANXIETY DISORDER: Primary | ICD-10-CM

## 2024-07-01 PROCEDURE — 90837 PSYTX W PT 60 MINUTES: CPT | Performed by: SOCIAL WORKER
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
## 2024-07-30 ENCOUNTER — TELEMEDICINE (OUTPATIENT)
Dept: PSYCHIATRY | Facility: CLINIC | Age: 54
End: 2024-07-30
Payer: COMMERCIAL

## 2024-07-30 DIAGNOSIS — F41.1 GENERALIZED ANXIETY DISORDER: Primary | ICD-10-CM

## 2024-07-30 DIAGNOSIS — F33.1 MAJOR DEPRESSIVE DISORDER, RECURRENT EPISODE, MODERATE: ICD-10-CM

## 2024-07-30 NOTE — PROGRESS NOTES
"Date: 2024  Time In: 10:02 EDT  Time out: 10:55 EDT      This provider is located at [facility], [facility] [place], [place], [facility], using a secure MyChart Video Visit through [employer]. Patient is being seen remotely via telehealth at their home address is located in [place]. Patient stated they are in a secure environment for this session. The patient's condition being diagnosed and treated is appropriate for telemedicine. The provider identified themself as well as their credentials. The patient, or  patient's legal guardian consent to be seen remotely, and when consent is given they understand that the consent allows for patient identifiable information to be sent to a third party as needed. They may refuse to be seen remotely at any time. The electronic data is encrypted and password protected, and the patient's or  legal guardian has been advised of the potential risks to privacy not withstanding such measures.   PT Identifiers used: Name and .    You have chosen to receive care through a telehealth visit.  Do you consent to use a video/audio connection for your medical care today? Yes     Subjective   Smooth Tse is a 53 y.o. male who presents today for follow up    Chief Complaint: Anxiety and depression      History of Present Illness: Client discussed how things have been since last session. Client reports that he has felt more depressed recently, feels his depression is currently worse than his anxiety.  Client described his depression as a sense of boredom, \"shuffling into work\" and in general just not enjoying things.  Therapist and client discussed concept of \"mindfulness\". Therapist provided client with psycho education regarding mindfulness        Clinical Maneuvering/Intervention:    On a scale 0-10 ( with 10 being the worst)  Anxiety: 3/10 currently, states that there have been some situations since last session which increased it to a 10/10  Depression: " 8/10 currently    Sleep: No current concerns with sleep    Appetite:  He reports that he can go all day without eating, and then will eat late in the evening. .  He reports that he stays hydrated, but states that eating is more difficult, he states that there are times when just nothing that looks good to him.      Assisted patient in processing above session content; acknowledged and normalized patient’s thoughts, feelings, and concerns.  Rationalized patient thought process regarding concerns presented at session.  Discussed triggers associated with patient's  anxiety  and depression  Also discussed coping skills for patient to implement such as mindfulness     Allowed patient to freely discuss issues without interruption or judgment. Provided safe, confidential environment to facilitate the development of positive therapeutic relationship and encourage open, honest communication. Assisted patient in identifying risk factors which would indicate the need for higher level of care including thoughts to harm self or others and/or self-harming behavior and encouraged patient to contact this office, call 911, or present to the nearest emergency room should any of these events occur. Discussed crisis intervention services and means to access. Patient adamantly and convincingly denies current suicidal or homicidal ideation or perceptual disturbance.    Assessment:     Patient appears to maintain relative stability as compared to their baseline.  However, patient continues to struggle with anxiety and depression which continues to cause impairment in important areas of functioning.  A result, they can be reasonably expected to continue to benefit from treatment and would likely be at increased risk for decompensation otherwise.         Mental Status Exam:   Hygiene:   good  Cooperation:  Cooperative  Eye Contact:  Good  Psychomotor Behavior:  Appropriate  Affect:  Restricted  Mood: sad, depressed, and anxious  Speech:   Normal  Thought Process:  Goal directed and Linear  Thought Content:  Mood congruent  Suicidal:  None  Homicidal:  None  Hallucinations:  None  Delusion:  None  Memory:  No current changes reported  Orientation:  Person, Place, Time, and Situation  Reliability:  good  Insight:  Fair  Judgement:  Fair  Impulse Control:  Fair  Physical/Medical Issues:   No current changes reported        Patient's Support Network Includes:  wife, children, and extended family    Functional Status: Moderate impairment     Progress toward goal: Not at goal    Prognosis: Fair with Ongoing Treatment         Plan:    Patient will continue in individual outpatient therapy with focus on improved functioning and coping skills, maintaining stability, and avoiding decompensation and the need for higher level of care.    Patient will adhere to medication regimen as prescribed and report any side effects. Patient will contact this office, call 911 or present to the nearest emergency room should suicidal or homicidal ideations occur. Provide Cognitive Behavioral Therapy and Solution Focused Therapy to improve functioning, maintain stability, and avoid decompensation and the need for higher level of care.     Return in about 2 weeks (around 8/13/2024).      VISIT DIAGNOSIS:    Diagnosis Plan   1. Generalized anxiety disorder        2. Major depressive disorder, recurrent episode, moderate               This document has been electronically signed by Louise Castellon LCSW  July 30, 2024      Part of this note may be an electronic transcription/translation of spoken language to printed text using the Dragon Dictation System.

## 2024-08-08 ENCOUNTER — TELEMEDICINE (OUTPATIENT)
Dept: PSYCHIATRY | Facility: CLINIC | Age: 54
End: 2024-08-08
Payer: COMMERCIAL

## 2024-08-08 DIAGNOSIS — F33.1 MAJOR DEPRESSIVE DISORDER, RECURRENT EPISODE, MODERATE: ICD-10-CM

## 2024-08-08 DIAGNOSIS — F41.1 GENERALIZED ANXIETY DISORDER: Primary | ICD-10-CM

## 2024-08-08 NOTE — PROGRESS NOTES
"Date: 2024  Time In: 14:30 EDT  Time out: 15:22 EDT      This provider is located at [facility], [facility] [place], [place], [facility], using a secure MyChart Video Visit through [employer]. Patient is being seen remotely via telehealth at their home address is located in [place]. Patient stated they are in a secure environment for this session. The patient's condition being diagnosed and treated is appropriate for telemedicine. The provider identified themself as well as their credentials. The patient, or  patient's legal guardian consent to be seen remotely, and when consent is given they understand that the consent allows for patient identifiable information to be sent to a third party as needed. They may refuse to be seen remotely at any time. The electronic data is encrypted and password protected, and the patient's or  legal guardian has been advised of the potential risks to privacy not withstanding such measures.   PT Identifiers used: Name and .    You have chosen to receive care through a telehealth visit.  Do you consent to use a video/audio connection for your medical care today? Yes     Subjective   Smooth Tse is a 53 y.o. male who presents today for follow up    Chief Complaint: Anxiety and depression     History of Present Illness: Client discussed how things have been since last session. Client and therapist reviewed subjects from last session. Client and therapist discussed the difficult for him to take things and put them into practice.Provided pschoeducation in session on \"acceptance\" and also on \"letting be\".      Clinical Maneuvering/Intervention:    On a scale 0-10 ( with 10 being the worst)  Anxiety: 5/10 currently  States that the highest since last session has been a 6 or 7/10  Depression: 4/10 currently     Sleep:  No current changes reported    Appetite:  Client reports that his appetite continues to come and go, but it is still not where he wants " "it to be      Assisted patient in processing above session content; acknowledged and normalized patient’s thoughts, feelings, and concerns.  Rationalized patient thought process regarding concerns presented at session.  Discussed triggers associated with patient's  anxiety  and depression  Also discussed coping skills for patient to implement such as  acceptance and \"letting go\"    Allowed patient to freely discuss issues without interruption or judgment. Provided safe, confidential environment to facilitate the development of positive therapeutic relationship and encourage open, honest communication. Assisted patient in identifying risk factors which would indicate the need for higher level of care including thoughts to harm self or others and/or self-harming behavior and encouraged patient to contact this office, call 911, or present to the nearest emergency room should any of these events occur. Discussed crisis intervention services and means to access. Patient adamantly and convincingly denies current suicidal or homicidal ideation or perceptual disturbance.    Assessment:     Patient appears to maintain relative stability as compared to their baseline.  However, patient continues to struggle with anxiety and depression which continues to cause impairment in important areas of functioning.  A result, they can be reasonably expected to continue to benefit from treatment and would likely be at increased risk for decompensation otherwise.         Mental Status Exam:   Hygiene:   good  Cooperation:  Cooperative  Eye Contact:  Good  Psychomotor Behavior:  Appropriate  Affect:  Appropriate  Mood: sad, depressed, and anxious  Speech:  Normal  Thought Process:  Goal directed and Linear  Thought Content:  Mood congruent  Suicidal:  None  Homicidal:  None  Hallucinations:  None  Delusion:  None  Memory:   No current changes reported  Orientation:  Person, Place, Time, and Situation  Reliability:  good  Insight:  " Fair  Judgement:  Good  Impulse Control:  Fair  Physical/Medical Issues:   No current changes reported        Patient's Support Network Includes:  wife, children, and extended family    Functional Status: Moderate impairment     Progress toward goal: Not at goal    Prognosis: Fair with Ongoing Treatment         Plan:    Patient will continue in individual outpatient therapy with focus on improved functioning and coping skills, maintaining stability, and avoiding decompensation and the need for higher level of care.    Patient will adhere to medication regimen as prescribed and report any side effects. Patient will contact this office, call 911 or present to the nearest emergency room should suicidal or homicidal ideations occur. Provide Cognitive Behavioral Therapy and Solution Focused Therapy to improve functioning, maintain stability, and avoid decompensation and the need for higher level of care.     Return in about 2 weeks (around 8/22/2024).      VISIT DIAGNOSIS:    Diagnosis Plan   1. Generalized anxiety disorder        2. Major depressive disorder, recurrent episode, moderate               This document has been electronically signed by Louise Castellon LCSW  August 8, 2024      Part of this note may be an electronic transcription/translation of spoken language to printed text using the Dragon Dictation System.

## 2024-08-09 NOTE — TREATMENT PLAN
Multi-Disciplinary Problems (from Behavioral Health Treatment Plan)      Active Problems       Problem: Anxiety  Start Date: 08/09/24      Problem Details: The patient self-scales this problem as a 7 with 10 being the worst.          Goal Priority Start Date Expected End Date End Date    Patient will develop and implement behavioral and cognitive strategies to reduce anxiety and irrational fears. -- 08/09/24 02/07/25 --    Goal Details: Progress toward goal:  Not appropriate to rate progress toward goal since this is the initial treatment plan.          Goal Intervention Frequency Start Date End Date    Help patient explore past emotional issues in relation to present anxiety. Q2 Weeks 08/09/24 --    Intervention Details: Duration of treatment until remission of symptoms  Help client understand the negative functional consequences of anxiety and current destructive anxiety reduction methods social as social withdrawal, disturbed family or other relationships  Client will commit to learn more constructive ways to achieve goasls.          Goal Intervention Frequency Start Date End Date    Help patient develop an awareness of their cognitive and physical responses to anxiety. Q2 Weeks 08/09/24 --    Intervention Details: Duration of treatment until remission of symptoms  Client will identify the specific anxiety symptoms that are personally most disturbing or most contribute to impaired functioning  Client will verbalize an understanding of the general physical and cognitive manifestations of and causes for anxiety.                  Problem: Depression  Start Date: 08/09/24      Problem Details: The patient self-scales this problem as a 6 with 10 being the worst.          Goal Priority Start Date Expected End Date End Date    Patient will demonstrate the ability to initiate new constructive life skills outside of sessions on a consistent basis. -- 08/09/24 02/07/25 --    Goal Details: Progress toward goal:  Not  appropriate to rate progress toward goal since this is the initial treatment plan.          Goal Intervention Frequency Start Date End Date    Assist patient in setting attainable activities of daily living goals. PRN 08/09/24 --      Goal Intervention Frequency Start Date End Date    Provide education about depression Q2 Weeks 08/09/24 --    Intervention Details: Duration of treatment until remission of symptoms  Client will verbally identify all of  symptoms representing depression    .          Goal Intervention Frequency Start Date End Date    Assist patient in developing healthy coping strategies. Q2 Weeks 08/09/24 --    Intervention Details: Duration of treatment until remission of symptoms  Identify specific thoughts that precipitate depressive moods  Replace depression promoting thoughts with mood elevating thoughts  Identify specific events activities that elevate or depress mood.                    Problem: Ineffective Coping  Start Date: 08/09/24      Problem Details: The patient self-scales this problem as a 4 with 10 being the worst.          Goal Priority Start Date Expected End Date End Date    Patient will demonstrate the ability to initiate new constructive life skills consistently. -- 08/09/24 02/07/25 --    Goal Details: Progress toward goal:  Not appropriate to rate progress toward goal since this is the initial treatment plan.            Goal Intervention Frequency Start Date End Date    Assist patient in identifying healthy coping behaviors. Q2 Weeks 08/09/24 --    Intervention Details: Duration of treatment until remission of symptoms.    Express feelings that may be interfering with the ability to cope  Accept the reality of the events  Identify a successful coping strategy from the past to help with adjustments to current stressful life events/  Develop a list of possible coping strategies                                I have discussed and reviewed this treatment plan with the patient.

## 2024-08-09 NOTE — PLAN OF CARE
A combination of CBT, Motivational Interviewing, psycho-education, and DBT techniques will be utilized to assist Pt in mitigating symptoms and maintain minimal baseline while working on goals to improve Pt's overall functioning and achieve optimal daily living.

## 2024-08-30 ENCOUNTER — TELEMEDICINE (OUTPATIENT)
Dept: PSYCHIATRY | Facility: CLINIC | Age: 54
End: 2024-08-30
Payer: COMMERCIAL

## 2024-08-30 DIAGNOSIS — F33.1 MAJOR DEPRESSIVE DISORDER, RECURRENT EPISODE, MODERATE: ICD-10-CM

## 2024-08-30 DIAGNOSIS — F41.1 GENERALIZED ANXIETY DISORDER: Primary | ICD-10-CM

## 2024-08-30 NOTE — PROGRESS NOTES
Date: 2024  Time In: 10:00 EDT  Time out: 10:52 EDT      This provider is located at [facility], [facility] [place], [place], [facility], using a secure [employer] Video Visit through [employer]. Patient is being seen remotely via telehealth at their home address is located in [place]. Patient stated they are in a secure environment for this session. The patient's condition being diagnosed and treated is appropriate for telemedicine. The provider identified themself as well as their credentials. The patient, or  patient's legal guardian consent to be seen remotely, and when consent is given they understand that the consent allows for patient identifiable information to be sent to a third party as needed. They may refuse to be seen remotely at any time. The electronic data is encrypted and password protected, and the patient's or  legal guardian has been advised of the potential risks to privacy not withstanding such measures.   PT Identifiers used: Name and .    You have chosen to receive care through a telehealth visit.  Do you consent to use a video/audio connection for your medical care today? Yes     Subjective   Smooth Tse is a 53 y.o. male who presents today for follow up    Chief Complaint: Anxiety and depression     History of Present Illness: Client discussed how things have been since last session .  Client discussed that his anxiety is higher in the mornings, and it has been this way for the last couple of weeks. Client states that he finds during the work week his mornings are more difficult for him.  He is still able to get up and function, but states that his body just feels very tense.  Client has been working to change his expectations about this job, and to line them more up to what the actual expectations/demands of the job are. Client and therapist worked on identifying the aspects of the job that he enjoys and receives from this job.  He has also been working  on improving communication with spouse.  He states that they are both are trying to be more effective in their communication.  Discussed identifying what they need to be communicating with each other about, including what each of them may be worrying about or holding on to.      Clinical Maneuvering/Intervention:    On a scale 0-10 ( with 10 being the worst)  Anxiety: 4/10 currently, highest since last session has been an 8/10  Depression: 2/10, feels that his depression has been low,not higher than a 3 since last session  Irritability:  4/10 currently, highest since session has been an 8/10 ( but this was very short lived and was able to recognize it and manage/cope with it)    Sleep:  Client reports that sleep is pretty good right now    Appetite:  client reports that with this current job he is traveling a lot and not getting home to really late at night and this is negatively impacting him .  He has also noticed that most foods just don't seem appealing to him.      Assisted patient in processing above session content; acknowledged and normalized patient’s thoughts, feelings, and concerns.  Rationalized patient thought process regarding concerns presented at session.  Discussed triggers associated with patient's  anxiety  and depression  Also discussed coping skills for patient to implement such as  discussion of emotional reasoning,  awareness of thoughts and feelings versus facts    Allowed patient to freely discuss issues without interruption or judgment. Provided safe, confidential environment to facilitate the development of positive therapeutic relationship and encourage open, honest communication. Assisted patient in identifying risk factors which would indicate the need for higher level of care including thoughts to harm self or others and/or self-harming behavior and encouraged patient to contact this office, call 911, or present to the nearest emergency room should any of these events occur. Discussed  crisis intervention services and means to access. Patient adamantly and convincingly denies current suicidal or homicidal ideation or perceptual disturbance.    Assessment:     Patient appears to maintain relative stability as compared to their baseline.  However, patient continues to struggle with Anxiety and Depression  which continues to cause impairment in important areas of functioning.  A result, they can be reasonably expected to continue to benefit from treatment and would likely be at increased risk for decompensation otherwise.           Mental Status Exam:   Hygiene:   good  Cooperation:  Cooperative  Eye Contact:  Good  Psychomotor Behavior:  Appropriate  Affect:  Appropriate  Mood: anxious  Speech:  Normal  Thought Process:  Goal directed and Linear  Thought Content:  Mood congruent  Suicidal:  None  Homicidal:  None  Hallucinations:  None  Delusion:  None  Memory:   No current changes   Orientation:  Person, Place, Time, and Situation  Reliability:  good  Insight:  Fair  Judgement:  Good  Impulse Control:  Good  Physical/Medical Issues:   No current changes reported        Patient's Support Network Includes:  wife, children, and extended family    Functional Status: Moderate impairment     Progress toward goal: Not at goal    Prognosis: Fair with Ongoing Treatment         Plan:    Patient will continue in individual outpatient therapy with focus on improved functioning and coping skills, maintaining stability, and avoiding decompensation and the need for higher level of care.    Patient will adhere to medication regimen as prescribed and report any side effects. Patient will contact this office, call 911 or present to the nearest emergency room should suicidal or homicidal ideations occur. Provide Cognitive Behavioral Therapy and Solution Focused Therapy to improve functioning, maintain stability, and avoid decompensation and the need for higher level of care.     Return in about 2 weeks (around  9/13/2024).      VISIT DIAGNOSIS:    Diagnosis Plan   1. Generalized anxiety disorder        2. Major depressive disorder, recurrent episode, moderate               This document has been electronically signed by Louise Castellon LCSW  August 30, 2024      Part of this note may be an electronic transcription/translation of spoken language to printed text using the Dragon Dictation System.

## 2024-09-11 ENCOUNTER — TELEMEDICINE (OUTPATIENT)
Dept: PSYCHIATRY | Facility: CLINIC | Age: 54
End: 2024-09-11
Payer: COMMERCIAL

## 2024-09-11 DIAGNOSIS — F41.1 GENERALIZED ANXIETY DISORDER: Primary | ICD-10-CM

## 2024-09-11 DIAGNOSIS — F33.1 MAJOR DEPRESSIVE DISORDER, RECURRENT EPISODE, MODERATE: ICD-10-CM

## 2024-09-11 PROCEDURE — 90837 PSYTX W PT 60 MINUTES: CPT | Performed by: SOCIAL WORKER

## 2024-09-11 NOTE — PROGRESS NOTES
Date: 2024  Time In: 09:01 EDT  Time out: 09:54 EDT      This provider is located at [facility], [facility] [place], [place], [facility], using a secure [employer] Video Visit through [employer]. Patient is being seen remotely via telehealth at their home address is located in [place]. Patient stated they are in a secure environment for this session. The patient's condition being diagnosed and treated is appropriate for telemedicine. The provider identified themself as well as their credentials. The patient, or  patient's legal guardian consent to be seen remotely, and when consent is given they understand that the consent allows for patient identifiable information to be sent to a third party as needed. They may refuse to be seen remotely at any time. The electronic data is encrypted and password protected, and the patient's or  legal guardian has been advised of the potential risks to privacy not withstanding such measures.   PT Identifiers used: Name and .    You have chosen to receive care through a telehealth visit.  Do you consent to use a video/audio connection for your medical care today? Yes     Subjective   Smooth Tse is a 53 y.o. male who presents today for follow up    Chief Complaint: Anxiety and Depression     History of Present Illness: Client discussed how things have been since last session. Client discussed current work stressors which are increasing anxiety for him and discussed responses to those stressors. Client discussed an underlying fear that when he has to work with others that his job will find out that he is not adequate.  He has done this job for many hears but struggles with a feeling of somehow not being good enough.  Client and therapist discussed whether he sees the positives in his abilities, or his mind only dwells on the things that he feels he is not good at.  Client reports that he has been working on identifying negative  "thoughts/cognitive distortions and not allowing himself to spiral out with those types of thoughts. He has noticed that he has felt his \"general irritability\" growing.  He states that this seems to be part of the cycle that he has, he will be irritable and this then leads to an increase in anxious thoughts which then leads to an anger outbursts.   We discussed that we need to begin to look at that irritability and anger to see if it is masking another emotion. Client and therapist explored this pattern/cycle of mood fluctuations and looked at family history of mental health issues especially on his mom's side.      Clinical Maneuvering/Intervention:    On a scale 0-10 ( with 10 being the worst)  Anxiety: 4/10 currently, highest since last session 7/10  Depression: 4/10    Sleep: No current concerns with sleep    Appetite:  One full meal a day, and will graze a little bit thru out the day, so it is a little better than it was      Assisted patient in processing above session content; acknowledged and normalized patient’s thoughts, feelings, and concerns.  Rationalized patient thought process regarding concerns presented at session.  Discussed triggers associated with patient's  anxiety  and depression  Also discussed coping skills for patient to implement such as self care  and identification of fluctuations in mood    Allowed patient to freely discuss issues without interruption or judgment. Provided safe, confidential environment to facilitate the development of positive therapeutic relationship and encourage open, honest communication. Assisted patient in identifying risk factors which would indicate the need for higher level of care including thoughts to harm self or others and/or self-harming behavior and encouraged patient to contact this office, call 911, or present to the nearest emergency room should any of these events occur. Discussed crisis intervention services and means to access. Patient adamantly and " convincingly denies current suicidal or homicidal ideation or perceptual disturbance.    Assessment:     Patient appears to maintain relative stability as compared to their baseline.  However, patient continues to struggle with anxiety and depression  which continues to cause impairment in important areas of functioning.  A result, they can be reasonably expected to continue to benefit from treatment and would likely be at increased risk for decompensation otherwise.         Mental Status Exam:   Hygiene:   good  Cooperation:  Cooperative  Eye Contact:  Good  Psychomotor Behavior:  Appropriate  Affect:  Appropriate  Mood: depressed and anxious  Speech:  Normal  Thought Process:  Goal directed and Linear  Thought Content:  Mood congruent  Suicidal:  None  Homicidal:  None  Hallucinations:  None  Delusion:  None  Memory:   No current   Orientation:  Person, Place, Time, and Situation  Reliability:  good  Insight:  Fair  Judgement:  Fair  Impulse Control:  Fair  Physical/Medical Issues:   no current changes reported        Patient's Support Network Includes:  wife, children, and extended family    Functional Status: Moderate impairment     Progress toward goal: Not at goal    Prognosis: Fair with Ongoing Treatment         Plan:    Patient will continue in individual outpatient therapy with focus on improved functioning and coping skills, maintaining stability, and avoiding decompensation and the need for higher level of care.    Patient will adhere to medication regimen as prescribed and report any side effects. Patient will contact this office, call 911 or present to the nearest emergency room should suicidal or homicidal ideations occur. Provide Cognitive Behavioral Therapy and Solution Focused Therapy to improve functioning, maintain stability, and avoid decompensation and the need for higher level of care.     Return in about 2 weeks (around 9/25/2024).      VISIT DIAGNOSIS:    Diagnosis Plan   1. Generalized  anxiety disorder        2. Major depressive disorder, recurrent episode, moderate               This document has been electronically signed by Louise Castellon LCSW  September 11, 2024      Part of this note may be an electronic transcription/translation of spoken language to printed text using the Dragon Dictation System.

## 2024-09-24 ENCOUNTER — TELEMEDICINE (OUTPATIENT)
Dept: PSYCHIATRY | Facility: CLINIC | Age: 54
End: 2024-09-24
Payer: COMMERCIAL

## 2024-09-24 DIAGNOSIS — F33.1 MAJOR DEPRESSIVE DISORDER, RECURRENT EPISODE, MODERATE: ICD-10-CM

## 2024-09-24 DIAGNOSIS — F41.1 GENERALIZED ANXIETY DISORDER: Primary | ICD-10-CM

## 2024-09-24 PROCEDURE — 90837 PSYTX W PT 60 MINUTES: CPT | Performed by: SOCIAL WORKER

## 2024-10-09 ENCOUNTER — TELEMEDICINE (OUTPATIENT)
Dept: PSYCHIATRY | Facility: CLINIC | Age: 54
End: 2024-10-09
Payer: COMMERCIAL

## 2024-10-09 DIAGNOSIS — F41.1 GENERALIZED ANXIETY DISORDER: Primary | ICD-10-CM

## 2024-10-09 DIAGNOSIS — F33.1 MAJOR DEPRESSIVE DISORDER, RECURRENT EPISODE, MODERATE: ICD-10-CM

## 2024-10-09 NOTE — PROGRESS NOTES
"Date: 2024  Time In: 09:03 EDT  Time out: 09:56 EDT      This provider is located at [facility], [facility] [place], [place], [facility], using a secure MyChart Video Visit through [employer]. Patient is being seen remotely via telehealth at their home address is located in [place]. Patient stated they are in a secure environment for this session. The patient's condition being diagnosed and treated is appropriate for telemedicine. The provider identified themself as well as their credentials. The patient, or  patient's legal guardian consent to be seen remotely, and when consent is given they understand that the consent allows for patient identifiable information to be sent to a third party as needed. They may refuse to be seen remotely at any time. The electronic data is encrypted and password protected, and the patient's or  legal guardian has been advised of the potential risks to privacy not withstanding such measures.   PT Identifiers used: Name and .    You have chosen to receive care through a telehealth visit.  Do you consent to use a video/audio connection for your medical care today? Yes     Subjective   Smooth Tse is a 54 y.o. male who presents today for follow up    Chief Complaint: Anxiety, Depression     History of Present Illness: Client discussed how things have been since last session.  Client reports that he has identified some negative thoughts which he has been able to challenge \"and put them away in a better place\".  He states that when the thoughts come it does not seem to build up the way it has in the past for him.  Client reports that these seem to be thoughts of how he has been wronged in the past, or when he feels that he is not responding to situations.  Discussed with client the tendency to ruminate on past wrongs he feels he has experienced.  \"They tend to validate that I am a piece of crap\".  The client denies adamantly any adverse childhood " "experiences, he states that he grew up in a loving nurturing environment.He states that during his childhood he was involved in sports and \"fun stuff\", he states that he was popular and outgoing even though he was quiet.  During his teenage years he discovered alcohol \"and it took me away\".  He states that it made him even more fun, more outgoing, \"more center of attention\".  He states that he made him feel more like a free spirit.  He denies abuse of the alcohol during that time, but did use it frequently/ He no longer drinks, he stopped drinking about 10 years ago, he felt tired the next day and physically it just made him tired.  He states that he does not regret drinking but no longer misses drinking. Client and therapist discussed self love/self esteem/self worth.  He admits that this is an area that he struggles with. Client and therapist discussed what qualities about himself he loves/likes, he identified 1.  I am a pretty hard worker, 2.  I am pretty good with directions with travel, 3.  I like to think I have a pretty good heart and kind.    Client and therapist discussed the process of identifying/challenging negative thoughts:  1.  Identification of negative thoughts, 2.  What triggered the thought, 3.  What emotions do I feel while having this thought, 4.  Is this thought true or just an opinion, 5.  Introduction the concept that could be wrong, 6.  How else can I look at this, 7.  Is this negative thought serving a positive purpose in my life, if so how?, 8.  Is this thought interfering with my happiness, 9.  How can I reframe this thought into something more positive, 10.  Is the true reason for this thought because I am trying to avoid something,       Clinical Maneuvering/Intervention:    On a scale 0-10 ( with 10 being the worst)  Anxiety: 5/10  Depression: 3/10    Sleep: No current concerns with sleep    Appetite: No current eating issues      Assisted patient in processing above session content; " acknowledged and normalized patient’s thoughts, feelings, and concerns.  Rationalized patient thought process regarding concerns presented at session.  Discussed triggers associated with patient's  anxiety  and depression  Also discussed coping skills for patient to implement such as  identification of negative thoughts and challenge    Allowed patient to freely discuss issues without interruption or judgment. Provided safe, confidential environment to facilitate the development of positive therapeutic relationship and encourage open, honest communication. Assisted patient in identifying risk factors which would indicate the need for higher level of care including thoughts to harm self or others and/or self-harming behavior and encouraged patient to contact this office, call 911, or present to the nearest emergency room should any of these events occur. Discussed crisis intervention services and means to access. Patient adamantly and convincingly denies current suicidal or homicidal ideation or perceptual disturbance.    Assessment:     Patient appears to maintain relative stability as compared to their baseline.  However, patient continues to struggle with No chief complaint on file.   which continues to cause impairment in important areas of functioning.  A result, they can be reasonably expected to continue to benefit from treatment and would likely be at increased risk for decompensation otherwise.      PHQ-9 Depression Screening  Little interest or pleasure in doing things?     Feeling down, depressed, or hopeless?     Trouble falling or staying asleep, or sleeping too much?     Feeling tired or having little energy?     Poor appetite or overeating?     Feeling bad about yourself - or that you are a failure or have let yourself or your family down?     Trouble concentrating on things, such as reading the newspaper or watching television?     Moving or speaking so slowly that other people could have noticed? Or  the opposite - being so fidgety or restless that you have been moving around a lot more than usual?     Thoughts that you would be better off dead, or of hurting yourself in some way?     PHQ-9 Total Score     If you checked off any problems, how difficult have these problems made it for you to do your work, take care of things at home, or get along with other people?       SAUD-7   Feeling nervous, anxious or on edge?     Not being able to stop or control worrying?     Worrying too much about different things?     Trouble Relaxing     Being so restless that it is hard to sit still     Becoming easily annoyed or irritable     Feeling afraid as if something awful might happen?     SAUD Total Score     If you checked any problesm, how difficult have these problems made it for you to do your work, take care of things at home, or get along with other people?            Mental Status Exam:   Hygiene:   good  Cooperation:  Cooperative  Eye Contact:  Good  Psychomotor Behavior:  Appropriate  Affect:  Appropriate  Mood: anxious  Speech:  Normal  Thought Process:  Goal directed and Linear  Thought Content:  Mood congruent  Suicidal:  None  Homicidal:  None  Hallucinations:  None  Delusion:  None  Memory:  Intact  Orientation:  Person, Place, Time, and Situation  Reliability:  good  Insight:  Good  Judgement:  Good  Impulse Control:  Good  Physical/Medical Issues:   No current changes        Patient's Support Network Includes:  wife, children, and extended family    Functional Status: Mild impairment     Progress toward goal: Not at goal    Prognosis: Fair with Ongoing Treatment         Plan:    Patient will continue in individual outpatient therapy with focus on improved functioning and coping skills, maintaining stability, and avoiding decompensation and the need for higher level of care.    Patient will adhere to medication regimen as prescribed and report any side effects. Patient will contact this office, call 911 or  present to the nearest emergency room should suicidal or homicidal ideations occur. Provide Cognitive Behavioral Therapy and Solution Focused Therapy to improve functioning, maintain stability, and avoid decompensation and the need for higher level of care.     Return in about 2 weeks (around 10/23/2024).      VISIT DIAGNOSIS:    Diagnosis Plan   1. Generalized anxiety disorder        2. Major depressive disorder, recurrent episode, moderate               This document has been electronically signed by Louise Castellon LCSW  October 9, 2024      Part of this note may be an electronic transcription/translation of spoken language to printed text using the Dragon Dictation System.

## 2024-10-22 ENCOUNTER — TELEMEDICINE (OUTPATIENT)
Dept: PSYCHIATRY | Facility: CLINIC | Age: 54
End: 2024-10-22
Payer: COMMERCIAL

## 2024-10-22 DIAGNOSIS — F33.1 MAJOR DEPRESSIVE DISORDER, RECURRENT EPISODE, MODERATE: ICD-10-CM

## 2024-10-22 DIAGNOSIS — F41.1 GENERALIZED ANXIETY DISORDER: Primary | ICD-10-CM

## 2024-10-22 NOTE — PROGRESS NOTES
"Date: 2024  Time In: 08:59 EDT  Time out: 09:51 EDT      This provider is located at [facility], [employer] [place], [place], [facility], using a secure MyChart Video Visit through [employer]. Patient is being seen remotely via telehealth at their home address is located in [place]. Patient stated they are in a secure environment for this session. The patient's condition being diagnosed and treated is appropriate for telemedicine. The provider identified themself as well as their credentials. The patient, or  patient's legal guardian consent to be seen remotely, and when consent is given they understand that the consent allows for patient identifiable information to be sent to a third party as needed. They may refuse to be seen remotely at any time. The electronic data is encrypted and password protected, and the patient's or  legal guardian has been advised of the potential risks to privacy not withstanding such measures.   PT Identifiers used: Name and .    You have chosen to receive care through a telehealth visit.  Do you consent to use a video/audio connection for your medical care today? Yes     Subjective   Smooth Tse is a 54 y.o. male who presents today for follow up    Chief Complaint: Anxiety and depression      History of Present Illness: Client discussed how things have been since last session.  He reports that since last session, he has not been very busy, which generally leads to some increased negative thinking for him.  He states that he has tried to really practice challenging/re-framing those negative thoughts. States that he is also purposefully trying to hold onto positive moments/thoughts.    Client was sharing that he was off work for about 10 days and it was a \"double edged sword\", in that while he physically was able to feel relief, but the whole time he was off he really felt like he needed to be do something, and because he did not go anywhere or do " "something big that the time had been wasted.  This lead to a discussion that he feels on edge \"tight\" all of the time. We discussed the last time that he felt he was able to relax.  He states that last year his family went to the beach for about 7 days, and the last 4 days of the vacation he was able to feel fully relaxed.  He states that it is very difficult for him to let go.  He describes this as not over-thinking, he states that it is hard for him to be in the moment.      Clinical Maneuvering/Intervention:    On a scale 0-10 ( with 10 being the worst)  Anxiety: 4/10 currently ( this has been baseline since last session  Depression: 4/10 currently ( 7/10 highest since last session)    Sleep: No current concerns with sleep    Appetite: No current eating issues      Assisted patient in processing above session content; acknowledged and normalized patient’s thoughts, feelings, and concerns.  Rationalized patient thought process regarding concerns presented at session.  Discussed triggers associated with patient's  anxiety  and depression  Also discussed coping skills for patient to implement such as  challenging/re-framing negative thoughts    Allowed patient to freely discuss issues without interruption or judgment. Provided safe, confidential environment to facilitate the development of positive therapeutic relationship and encourage open, honest communication. Assisted patient in identifying risk factors which would indicate the need for higher level of care including thoughts to harm self or others and/or self-harming behavior and encouraged patient to contact this office, call 911, or present to the nearest emergency room should any of these events occur. Discussed crisis intervention services and means to access. Patient adamantly and convincingly denies current suicidal or homicidal ideation or perceptual disturbance.    Assessment:     Patient appears to maintain relative stability as compared to their " baseline.  However, patient continues to struggle with anxiety and depression  which continues to cause impairment in important areas of functioning.  A result, they can be reasonably expected to continue to benefit from treatment and would likely be at increased risk for decompensation otherwise.           Mental Status Exam:   Hygiene:   good  Cooperation:  Cooperative  Eye Contact:  Good  Psychomotor Behavior:  Appropriate  Affect:  Appropriate  Mood: anxiious and depressed  Speech:  Normal  Thought Process:  Goal directed and Linear  Thought Content:  Mood congruent  Suicidal:  None  Homicidal:  None  Hallucinations:  None  Delusion:  None  Memory:  Intact  Orientation:  Person, Place, Time, and Situation  Reliability:  fair  Insight:  Good  Judgement:  Good  Impulse Control:  Good  Physical/Medical Issues:   no current issues reported        Patient's Support Network Includes:  wife, children, and extended family    Functional Status: Mild impairment     Progress toward goal: Not at goal    Prognosis: Fair with Ongoing Treatment         Plan:    Patient will continue in individual outpatient therapy with focus on improved functioning and coping skills, maintaining stability, and avoiding decompensation and the need for higher level of care.    Patient will adhere to medication regimen as prescribed and report any side effects. Patient will contact this office, call 911 or present to the nearest emergency room should suicidal or homicidal ideations occur. Provide Cognitive Behavioral Therapy and Solution Focused Therapy to improve functioning, maintain stability, and avoid decompensation and the need for higher level of care.     Return in about 2 weeks (around 11/5/2024).      VISIT DIAGNOSIS:    Diagnosis Plan   1. Generalized anxiety disorder        2. Major depressive disorder, recurrent episode, moderate               This document has been electronically signed by Luoise Castellon LCSW  October 22, 2024       Part of this note may be an electronic transcription/translation of spoken language to printed text using the Dragon Dictation System.

## 2024-11-06 ENCOUNTER — TELEMEDICINE (OUTPATIENT)
Dept: PSYCHIATRY | Facility: CLINIC | Age: 54
End: 2024-11-06
Payer: COMMERCIAL

## 2024-11-06 DIAGNOSIS — F41.1 GENERALIZED ANXIETY DISORDER: Primary | ICD-10-CM

## 2024-11-06 DIAGNOSIS — F33.1 MAJOR DEPRESSIVE DISORDER, RECURRENT EPISODE, MODERATE: ICD-10-CM

## 2024-11-06 NOTE — PROGRESS NOTES
"Date: 2024  Time In: 09:01 EST  Time out: 09:57 EST      This provider is located at [facility], [address], [place], [place], [facility], using a secure [employer] Video Visit through [employer]. Patient is being seen remotely via telehealth at their home address is located in [place]. Patient stated they are in a secure environment for this session. The patient's condition being diagnosed and treated is appropriate for telemedicine. The provider identified themself as well as their credentials. The patient, or  patient's legal guardian consent to be seen remotely, and when consent is given they understand that the consent allows for patient identifiable information to be sent to a third party as needed. They may refuse to be seen remotely at any time. The electronic data is encrypted and password protected, and the patient's or  legal guardian has been advised of the potential risks to privacy not withstanding such measures.   PT Identifiers used: Name and .    You have chosen to receive care through a telehealth visit.  Do you consent to use a video/audio connection for your medical care today? Yes     Subjective   Smooth Tse is a 54 y.o. male who presents today for follow up    Chief Complaint: Anxiety and Depression     History of Present Illness: Client discussed how things have been since last session.  Client expressed that he is experiencing a great deal of anxiety over the election and it's results.  Client processed his feelings about the election.  He describes \"getting all worked up\" and discussed this with this writer.He is able to process thru those feelings and not get drawn into them for days.  He states that it is not the politics as much as his worry about people and their safety and well being. He described some of his own experiences that are affecting his reaction/response to the current situation.   He discussed [facility]  He reports that there was an incident with another .  Client reports that he exploded when his wife asked him if he had done something to cause the other  to get upset.  He states that looking back she was only seeking information but he took it as criticism and quickly escalated to defensiveness.       Clinical Maneuvering/Intervention:    On a scale 0-10 ( with 10 being the worst)  Anxiety: 6/10 currently  Depression: 6/10 currently    Sleep:  No current issues reported    Appetite:  Reports that he can go all day, and eat very little, will snack and/or graze.  Reports that there are days where he will actually not eat.      Assisted patient in processing above session content; acknowledged and normalized patient’s thoughts, feelings, and concerns.  Rationalized patient thought process regarding concerns presented at session.  Discussed triggers associated with patient's  anxiety and depression Also discussed coping skills for patient to implement such as self care , positive self talk , and grounding techniques    Allowed patient to freely discuss issues without interruption or judgment. Provided safe, confidential environment to facilitate the development of positive therapeutic relationship and encourage open, honest communication. Assisted patient in identifying risk factors which would indicate the need for higher level of care including thoughts to harm self or others and/or self-harming behavior and encouraged patient to contact this office, call 911, or present to the nearest emergency room should any of these events occur. Discussed crisis intervention services and means to access. Patient adamantly and convincingly denies current suicidal or homicidal ideation or perceptual disturbance.    Assessment:     Patient appears to maintain relative stability as compared to their baseline.  However, patient continues to struggle with anxiety and depression  which continues to cause impairment  in important areas of functioning.  A result, they can be reasonably expected to continue to benefit from treatment and would likely be at increased risk for decompensation otherwise.           Mental Status Exam:   Hygiene:   good  Cooperation:  Cooperative  Eye Contact:  Good  Psychomotor Behavior:  Appropriate  Affect:  Appropriate  Mood: depressed and anxious  Speech:  Normal  Thought Process:  Goal directed and Linear  Thought Content:  Mood congruent  Suicidal:  None  Homicidal:  None  Hallucinations:  None  Delusion:  None  Memory:  Intact  Orientation:  Person, Place, Time, and Situation  Reliability:  good  Insight:  Good  Judgement:  Good  Impulse Control:  Good  Physical/Medical Issues:   No current changes reported        Patient's Support Network Includes:  wife, children, and extended family    Functional Status: Mild impairment     Progress toward goal: Not at goal    Prognosis: Fair with Ongoing Treatment         Plan:    Patient will continue in individual outpatient therapy with focus on improved functioning and coping skills, maintaining stability, and avoiding decompensation and the need for higher level of care.    Patient will adhere to medication regimen as prescribed and report any side effects. Patient will contact this office, call 911 or present to the nearest emergency room should suicidal or homicidal ideations occur. Provide Cognitive Behavioral Therapy and Solution Focused Therapy to improve functioning, maintain stability, and avoid decompensation and the need for higher level of care.     Return in about 2 weeks (around 11/20/2024).      VISIT DIAGNOSIS:    Diagnosis Plan   1. Generalized anxiety disorder        2. Major depressive disorder, recurrent episode, moderate               This document has been electronically signed by Louise Castellon LCSW  November 6, 2024      Part of this note may be an electronic transcription/translation of spoken language to printed text using the  [facility] Dictation System.

## 2024-11-06 NOTE — TREATMENT PLAN
Treatment Plan reviewed .  Patient continues to work towards goal.  Patient will continue with individual one hour bi weekly sessions.  Patient continues to struggle with anxiety, depression and coping.  Patient would benefit from continued therapy with this therapist at this time    I have discussed and reviewed this treatment plan with the patient.

## 2024-11-20 ENCOUNTER — TELEMEDICINE (OUTPATIENT)
Dept: PSYCHIATRY | Facility: CLINIC | Age: 54
End: 2024-11-20
Payer: COMMERCIAL

## 2024-11-20 DIAGNOSIS — F33.1 MAJOR DEPRESSIVE DISORDER, RECURRENT EPISODE, MODERATE: ICD-10-CM

## 2024-11-20 DIAGNOSIS — F41.1 GENERALIZED ANXIETY DISORDER: Primary | ICD-10-CM

## 2024-11-20 NOTE — PROGRESS NOTES
"Date: 2024  Time In: 09:02 EST  Time out: 09:56 EST      This provider is located at [facility], [address], [place], [place], [facility], using a secure [employer] Video Visit through [employer]. Patient is being seen remotely via telehealth at their home address is located in [place]. Patient stated they are in a secure environment for this session. The patient's condition being diagnosed and treated is appropriate for telemedicine. The provider identified themself as well as their credentials. The patient, or  patient's legal guardian consent to be seen remotely, and when consent is given they understand that the consent allows for patient identifiable information to be sent to a third party as needed. They may refuse to be seen remotely at any time. The electronic data is encrypted and password protected, and the patient's or  legal guardian has been advised of the potential risks to privacy not withstanding such measures.   PT Identifiers used: Name and .    You have chosen to receive care through a telehealth visit.  Do you consent to use a video/audio connection for your medical care today? Yes     Subjective   Smooth Tse is a 54 y.o. male who presents today for follow up    Chief Complaint: Anxiety and depression      History of Present Illness: client discussed how things have been since last session.  He reports that last Tuesday he woke up feeling physically bad and was running a significant fever, he states that once his fever broke he felt better.  He states that he just felt physical tired and worn out, and this then lead to him feeling on edge and irritable, and this resulted in him and his wife having a fight. \"I just wasn't who I wanted to be\".  He states that he struggled with feeling disappointed in himself, and states that he feels that he also disappointed his spouse. Client discussed core beliefs of unworthiness which underlie many of his mental health " issues.  Discussed and defined concepts related to this      Clinical Maneuvering/Intervention:    On a scale 0-10 ( with 10 being the worst)  Anxiety: 7/10  Depression: 6/10    Sleep: No current changes reported    Appetite:  No current changes reported       Assisted patient in processing above session content; acknowledged and normalized patient’s thoughts, feelings, and concerns.  Rationalized patient thought process regarding concerns presented at session.  Discussed triggers associated with patient's  anxiety  and depression  Also discussed coping skills for patient to implement such as self care  and positive self talk     Allowed patient to freely discuss issues without interruption or judgment. Provided safe, confidential environment to facilitate the development of positive therapeutic relationship and encourage open, honest communication. Assisted patient in identifying risk factors which would indicate the need for higher level of care including thoughts to harm self or others and/or self-harming behavior and encouraged patient to contact this office, call 911, or present to the nearest emergency room should any of these events occur. Discussed crisis intervention services and means to access. Patient adamantly and convincingly denies current suicidal or homicidal ideation or perceptual disturbance.    Assessment:     Patient appears to maintain relative stability as compared to their baseline.  However, patient continues to struggle with anxiety and depression  which continues to cause impairment in important areas of functioning.  A result, they can be reasonably expected to continue to benefit from treatment and would likely be at increased risk for decompensation otherwise.         Mental Status Exam:   Hygiene:   good  Cooperation:  Cooperative  Eye Contact:  Good  Psychomotor Behavior:  Appropriate  Affect:  Appropriate  Mood: sad, depressed, and anxious  Speech:  Normal  Thought Process:  Goal  directed and Linear  Thought Content:  Mood congruent  Suicidal:  None  Homicidal:  None  Hallucinations:  None  Delusion:  None  Memory:  Intact  Orientation:  Person, Place, Time, and Situation  Reliability:  good  Insight:  Fair  Judgement:  Fair  Impulse Control:  Fair  Physical/Medical Issues:   client reports that there was a day last week where he was sick and running a very high fever, he has felt tired and exhausted since that time        Patient's Support Network Includes:  wife, children, and extended family    Functional Status: Moderate impairment     Progress toward goal: Not at goal    Prognosis: Fair with Ongoing Treatment         Plan:    Patient will continue in individual outpatient therapy with focus on improved functioning and coping skills, maintaining stability, and avoiding decompensation and the need for higher level of care.    Patient will adhere to medication regimen as prescribed and report any side effects. Patient will contact this office, call 911 or present to the nearest emergency room should suicidal or homicidal ideations occur. Provide Cognitive Behavioral Therapy and Solution Focused Therapy to improve functioning, maintain stability, and avoid decompensation and the need for higher level of care.     Return in about 2 weeks (around 12/4/2024).      VISIT DIAGNOSIS:    Diagnosis Plan   1. Generalized anxiety disorder        2. Major depressive disorder, recurrent episode, moderate               This document has been electronically signed by Louise Castellon LCSW  November 20, 2024      Part of this note may be an electronic transcription/translation of spoken language to printed text using the Dragon Dictation System.

## 2024-11-25 ENCOUNTER — TELEMEDICINE (OUTPATIENT)
Dept: PSYCHIATRY | Facility: CLINIC | Age: 54
End: 2024-11-25
Payer: COMMERCIAL

## 2024-11-25 DIAGNOSIS — F41.1 GENERALIZED ANXIETY DISORDER: Primary | ICD-10-CM

## 2024-11-25 DIAGNOSIS — F33.1 MAJOR DEPRESSIVE DISORDER, RECURRENT EPISODE, MODERATE: ICD-10-CM

## 2024-11-25 DIAGNOSIS — F51.05 INSOMNIA DUE TO MENTAL CONDITION: ICD-10-CM

## 2024-11-25 RX ORDER — SERTRALINE HYDROCHLORIDE 25 MG/1
25 TABLET, FILM COATED ORAL DAILY
Qty: 90 TABLET | Refills: 1 | Status: SHIPPED | OUTPATIENT
Start: 2024-11-25

## 2024-11-25 NOTE — TREATMENT PLAN
Multi-Disciplinary Problems (from Behavioral Health Treatment Plan)      Active Problems       Problem: Anxiety  Start Date: 11/25/24      Problem Details: The patient self-scales this problem as a 5 with 10 being the worst.  work stresses        Goal Priority Start Date Expected End Date End Date    Patient will develop and implement behavioral and cognitive strategies to reduce anxiety and irrational fears. -- 11/25/24 05/26/25 --    Goal Details: Progress toward goal:  Not appropriate to rate progress toward goal since this is the initial treatment plan.          Goal Intervention Frequency Start Date End Date    Help patient explore past emotional issues in relation to present anxiety. Q Month 11/25/24 --    Intervention Details: Duration of treatment until remission of symptoms.          Goal Intervention Frequency Start Date End Date    Help patient develop an awareness of their cognitive and physical responses to anxiety. Q Month 11/25/24 --    Intervention Details: Duration of treatment until remission of symptoms.                  Problem: Depression  Start Date: 11/25/24      Problem Details: The patient self-scales this problem as a 4 with 10 being the worst.  work stresses        Goal Priority Start Date Expected End Date End Date    Patient will demonstrate the ability to initiate new constructive life skills outside of sessions on a consistent basis. -- 11/25/24 05/26/25 --    Goal Details: Progress toward goal:  Not appropriate to rate progress toward goal since this is the initial treatment plan.          Goal Intervention Frequency Start Date End Date    Assist patient in setting attainable activities of daily living goals. PRN 11/25/24 --      Goal Intervention Frequency Start Date End Date    Provide education about depression Q Month 11/25/24 --    Intervention Details: Duration of treatment until remission of symptoms.          Goal Intervention Frequency Start Date End Date    Assist patient in  developing healthy coping strategies. Q Month 11/25/24 --    Intervention Details: Duration of treatment until remission of symptoms.                          Reviewed By       Kely Sloan MD 11/25/24 [id number]                     I have discussed and reviewed this treatment plan with the patient.

## 2024-11-25 NOTE — PROGRESS NOTES
"Subjective   Smooth Tse is a 54 y.o. male who presents today for initial evaluation     Referring Provider:  No referring provider defined for this encounter.    Chief Complaint:  SAUD    History of Present Illness:     23: Initial Chart review:     Kofi: blank  Care Everywhere: none    Psychotropic medication chart review:  Present:  none    Previously:  Ativan  Trintellix 10 mg qday    EKG: none  Procedures: none  Head imaging: none  Labs: gluc 118 on cmp, reassuring thyroid studies, cbc. High ldl.    Initial Chart notes: Seen  by PCP. Wanted a referral to psych.      Chart review:   2024: no visits.  24: teletherapy x5.  : no visits. Pt didn't tolerate the abilify (took it for a few days).    Plannin24: Stable, well, no changes.  : Start hydroxyzine PRN for intermittent anger, per pts wishes. Referral to [facility] for therapy related to anger that occcurs a few times a year.  : Drank for 10 years [age], 28 years. Treats insomnia with cannabis. Sober since . Start abilify. Pt reports that compliance migh be an issue. 4w      Visits (Below):  \"Maynor\"      2024:   Mode of Visit: Video  Location of patient: -HOME-  Location of provider: +[facility]+  You have chosen to receive care through a telehealth visit.  The patient has signed the video visit consent form.  The visit included audio and video interaction. No technical issues occurred during this visit.  Interview:  \"We've been working through some things.\"  I still get easily irritable  I tolerated zoloft in the past  I'm very sensitive to medications  Using light box  This month has been worse  Mostly work stresses  GAD7 8.  Mood/Depression: MDD depressed mood, seasonal  Anxiety: SAUD more irritable, on edge, excessive worrying  Panic attacks: n  Energy: down  Concentration: stable  Insomnia: stable  Eating: 169x2, 179 lbs  Refills: y  Substances: def  Therapy: was Dr. Romero, now [facility] ([place], " "likes  Medication compliant: y  SE: n  No SI HI AVH.      2024:   In person interview:  \"It's going really well.\"  Likes the therapy, doing DBT  More peace.  Rarely uses hydroxyzine  It's been 8 mos since I had a blowup  Mood/Depression: stable MDD  Anxiety: stable SAUD   Panic attacks: n  Energy: stable  Concentration: stable  Sleeping: stable insomnia   Eatin, 179 lbs  Refills: y  Substances: def  Therapy: was Dr. Romero, now [facility]  Medication compliant: y  SE: n  No SI HI AVH.      : In person interview:  Chart review:   : no visits. Pt didn't tolerate the abilify (took it for a few days).  Plannin/19: Drank for 9 years [age], 28 years. Treats insomnia with cannabis. Sober since . Start abilify. Pt reports that compliance migh be an issue. 4w  \"It intensified my anxiety and I felt out of it.\"  I'm not going to give things weeks and weeks to kick in.  Medication and me usually don't get along  Wellbutrin worked for a while, and then \"it got crumply in my brain... like plastic.\"  It was better for depression  But it increased the anxiety  It builds... then I explode, and then after I explode... I feel awesome for a while.  Mood/Depression: stable MDD  Anxiety: stable SAUD, except about 3-4x a year  Panic attacks: n  Energy: stable  Concentration: stable  Sleeping: stable insomnia   Eatin lbs  Refills: y  Substances: cannabis in the evenings  Therapy: n  Medication compliant: y  SE: n  No SI HI AVH.        : In person.  Interview:  Chart review:   His/Her Story: \"My main gist of being here is 10 min every 3 or 4 months.\"  P5, G11  That's when bad Maynor comes out  Most of the time I'm pretty laid back but things can slowly build over several months, then sometimes I can yell at the top of my lungs, or throw things.  I've hit a wall before.  Then it's over and I'm fine... but it builds again.  Last episode was September.  I also have \"depressive episodes\".  Going on my whole life. " "11 yo that's when I felt \"this darkness come over me.\"  I saw my Mom armen during her episodes (maybe 15 min). Usually happy.  Her brother recently . He was known to have his sad times.  Growing up it was kindof cold, even though it was loving. You couldn't be really expressive.  I was living in a fantasy world to escape to a more exciting world  I used to lie, forge signatures for school  I drank at 16 yo, \"I needed alcohol.\" It turned me into this outgoing and fun charu.  Then drugs. I loved cocaine.  Depression/Mood: A little depressed   Depressed mood  Seasonal pattern: yes  Severity: Moderate  Duration: On and off for years since 11 yo  Anxiety:  Uncontrolled worrying, muscle tension, fatigue,  feeling on edge or restless, irritability, insomnia.  Severity: Moderate  Sense of doom  Duration: On and off for years since 11 yo  Panic attacks:  Psych ROS: Positive for depression, anxiety.  Negative for psychosis but positive for hypomania.  ADHD: def  PTSD: def  No SI HI AVH.  Medication compliant: na  Has a therapist for 10 years. Nick.    Access to Firearms: denies    PHQ-9 Depression Screening  PHQ-9 Total Score:      Little interest or pleasure in doing things?     Feeling down, depressed, or hopeless?     Trouble falling or staying asleep, or sleeping too much?     Feeling tired or having little energy?     Poor appetite or overeating?     Feeling bad about yourself - or that you are a failure or have let yourself or your family down?     Trouble concentrating on things, such as reading the newspaper or watching television?     Moving or speaking so slowly that other people could have noticed? Or the opposite - being so fidgety or restless that you have been moving around a lot more than usual?     Thoughts that you would be better off dead, or of hurting yourself in some way?     PHQ-9 Total Score       SAUD-7  Feeling nervous, anxious or on edge: (Patient-Rptd) Several days  Not being able to stop or control " worrying: (Patient-Rptd) More than half the days  Worrying too much about different things: (Patient-Rptd) Several days  Trouble Relaxing: (Patient-Rptd) Several days  Being so restless that it is hard to sit still: (Patient-Rptd) Several days  Feeling afraid as if something awful might happen: (Patient-Rptd) Several days  Becoming easily annoyed or irritable: (Patient-Rptd) Several days  SUAD 7 Total Score: (Patient-Rptd) 8  If you checked any problems, how difficult have these problems made it for you to do your work, take care of things at home, or get along with other people: (Patient-Rptd) Somewhat difficult    Past Surgical History:  Past Surgical History:   Procedure Laterality Date    VASECTOMY         Problem List:  Patient Active Problem List   Diagnosis    Anxiety disorder    Stress    Lipid screening       Allergy:   Allergies   Allergen Reactions    Ciprofloxacin Mental Status Change        Discontinued Medications:  There are no discontinued medications.        Current Medications:   Current Outpatient Medications   Medication Sig Dispense Refill    sertraline (Zoloft) 25 MG tablet Take 1 tablet by mouth Daily. 90 tablet 1     No current facility-administered medications for this visit.       Past Medical History:  Past Medical History:   Diagnosis Date    Anxiety     Depression        Past Psychiatric History:  Began Treatment: 15 years ago  Diagnoses: depression  Psychiatrist: multiple  Therapist: once a month  Admission History:Denies  Medication Trials:    Many    Pristiq, others    Wellbutrin: I got so spaced out.    Never put on a mood stabilizer    Self Harm: Denies  Suicide Attempts:Denies      Substance Abuse History:   Types: cannabis  Withdrawal Symptoms:Denies  Longest Period Sober:Not Applicable   AA: Not applicable     Social History:  Martial Status:  Employed:Yes  Kids:Yes  House:Lives in a house   History: Denies    Social History     Socioeconomic History    Marital  "status:    Tobacco Use    Smoking status: Never    Smokeless tobacco: Never   Vaping Use    Vaping status: Former   Substance and Sexual Activity    Alcohol use: Not Currently    Drug use: Yes     Types: Marijuana    Sexual activity: Yes     Partners: Female     Birth control/protection: Condom, Vasectomy       Family History: (1st degree)  Suicide Attempts: Denies  Suicide Completions:Denies      Family History   Problem Relation Age of Onset    Depression Mother     Cancer Mother     Anxiety disorder Mother     Depression Father     Cancer Father     Anxiety disorder Father        Developmental History:       Childhood: Denies Abuse, but my parents were older, Mom had me when she was 40, dad was 46  High School:Completed  College: 5 yr, no degree    Mental Status Exam  Appearance  : groomed, good eye contact, normal street clothes  Behavior  : pleasant and cooperative  Motor  : No abnormal  Speech  :normal rhythm, rate, volume, tone, not hyperverbal, not pressured, normal prosidy  Mood  : \"More anxious\"  Affect  : euthymic, mood incongruent, good variability  Thought Content  : negative suicidal ideations, negative homicidal ideations, negative obsessions  Perceptions  : negative auditory hallucinations, negative visual hallucinations  Thought Process  : linear  Insight/Judgement  : Fair/fair  Cognition  : grossly intact  Attention   : intact      Review of Systems:  Review of Systems   Constitutional:  Negative for diaphoresis and fatigue.   HENT:  Negative for drooling.    Eyes:  Negative for visual disturbance.   Respiratory:  Negative for cough and shortness of breath.    Cardiovascular:  Negative for chest pain, palpitations and leg swelling.   Gastrointestinal:  Negative for nausea and vomiting.   Endocrine: Negative for cold intolerance and heat intolerance.   Genitourinary:  Negative for difficulty urinating.   Musculoskeletal:  Negative for joint swelling.   Allergic/Immunologic: Negative for " immunocompromised state.   Neurological:  Negative for dizziness, seizures, speech difficulty and numbness.   Psychiatric/Behavioral:  Negative for confusion.        Physical Exam:  Physical Exam    Vital Signs:   There were no vitals taken for this visit.     Lab Results:   No visits with results within 6 Month(s) from this visit.   Latest known visit with results is:   Office Visit on 09/25/2023   Component Date Value Ref Range Status    Chlamydia trachomatis, RANDALL 09/25/2023 Negative  Negative Final    Gonococcus by RANDALL 09/25/2023 Negative  Negative Final    Trichomonas vaginosis 09/25/2023 Negative  Negative Final    Hepatitis B Surface Ag 09/25/2023 Non-Reactive  Non-Reactive Final    Hep A IgM 09/25/2023 Non-Reactive  Non-Reactive Final    Hep B C IgM 09/25/2023 Non-Reactive  Non-Reactive Final    Hepatitis C Ab 09/25/2023 Non-Reactive  Non-Reactive Final    HIV DUO 09/25/2023 Non-Reactive  Non-Reactive Final    HSV 1 IgG, Type Specific 09/25/2023 24.60 (H)  0.00 - 0.90 index Final                                     Negative        <0.91                                   Equivocal 0.91 - 1.09                                   Positive        >1.09   Note: Negative indicates no antibodies detected to   HSV-1. Equivocal may suggest early infection.  If   clinically appropriate, retest at later date. Positive   indicates antibodies detected to HSV-1.    HSV 2 IgG, Type Spec 09/25/2023 <0.91  0.00 - 0.90 index Final                                     Negative        <0.91                                   Equivocal 0.91 - 1.09                                   Positive        >1.09   HSV-2 Antibody Interpretation: Negative indicates no   detectable antibodies to HSV-2 were found. If recent   exposure is suspected, retest in 4-6 weeks. Equivocal   samples should be retested in 4-6 weeks. Positive   indicates the presence of detectable IgG antibody to   HSV-2. False positive results may occur. Repeat   testing, or  testing by a different method, may be   indicated in some settings (e.g. patients with low   likelihood of HSV infection). If clinically   appropriate, retest 4-6 weeks later.    RPR 09/25/2023 Non-Reactive  Non-Reactive Final    Hepatitis C Quantitation 09/25/2023 HCV Not Detected  IU/mL Final    Test Information 09/25/2023 Comment   Final    The quantitative range of this assay is 15 IU/mL to 100 million IU/mL.       EKG Results:  No orders to display       Imaging Results:  No Images in the past 120 days found..      Assessment & Plan   Diagnoses and all orders for this visit:    1. Generalized anxiety disorder (Primary)  -     sertraline (Zoloft) 25 MG tablet; Take 1 tablet by mouth Daily.  Dispense: 90 tablet; Refill: 1    2. Major depressive disorder, recurrent episode, moderate  -     sertraline (Zoloft) 25 MG tablet; Take 1 tablet by mouth Daily.  Dispense: 90 tablet; Refill: 1    3. Insomnia due to mental condition  -     sertraline (Zoloft) 25 MG tablet; Take 1 tablet by mouth Daily.  Dispense: 90 tablet; Refill: 1        Visit Diagnoses:    ICD-10-CM ICD-9-CM   1. Generalized anxiety disorder  F41.1 300.02   2. Major depressive disorder, recurrent episode, moderate  F33.1 296.32   3. Insomnia due to mental condition  F51.05 300.9     327.02     11/25/2024: Start light box, zoloft for MDD, SAUD. Watch sexual side effects (occurred in the past). Wellbutrin helped in the past as well (for years), but then it stopped helping.    Allowed patient to freely discuss and process issues, such as:  Anxiety at work.  Anxiety and depression regarding family conflict/relationships.  ... using Rogerian psychotherapeutic techniques including unconditional positive regard, reflective listening, and demonstrating clear empathy, with the goal of ameliorating symptoms and maintaining, restoring, or improving self-esteem, adaptive skills, and ego or psychological functions (John et al. 1991), the long-term goal of which is  to develop a better, healthier perspective and help the patient bear their circumstances more easily.  Time (minutes) spent providing supportive psychotherapy: 16  (This time is exclusive to the therapy session and separate from the time spent on activities used to meet the criteria for the E/M service (history, exam, medical decision-making).)  Start: 11:03  Stop: 11:19  Functional status: mild impairment  Treatment plan: Medication management and supportive psychotherapy  Prognosis: good  Progress: SAUD, MDD  6w    5/17/24: Stable, well, no changes.    2/13: Start hydroxyzine PRN for intermittent anger, per pts wishes. Referral to [place] for therapy related to anger that occcurs a few times a year.    12/19: Drank for 9 years 88-14, 28 years. Treats insomnia with cannabis. Sober since 2014. Start abilify. Pt reports that compliance migh be an issue. 4w    PLAN:  Safety: No acute safety concerns  Therapy: Currently Seeing a Therapist  Risk Assessment: Risk of self-harm acutely is moderate.  Risk factors include anxiety disorder, mood disorder, some AODA, and recent psychosocial stressors (pandemic). Protective factors include no family history, denies access to guns/weapons, no present SI, no history of suicide attempts or self-harm in the past, minimal AODA, healthcare seeking, future orientation, willingness to engage in care.  Risk of self-harm chronically is also moderate, but could be further elevated in the event of treatment noncompliance and/or AODA.  Meds:  STOP abilify 2 mg qhs. No help 5/24  START light box sept thru mar  START zoloft 25 mg qday. Risks, benefits, alternatives discussed with patient including GI upset, nausea vomiting diarrhea, hyponatremia, theoretical decrease of seizure threshold predisposing the patient to a slightly higher seizure risk, headaches, sexual dysfunction, serotonin syndrome, bleeding risk, increased suicidality in patients 24 years and younger, switching to  gaye/hypomania.  After discussion of these risks and benefits, the patient voiced understanding and agreed to proceed.  CONTINUE hydroxyzine 25 mg daily prn anxiety. Risks, benefits, alternatives discussed with patient including sedation, dizziness, fall risk, GI upset, and risk of increased CNS depression and elevated heart rate if taken with other antihistamines.  Use care when operating vehicle, vessel, or machine. After discussion of these risks and benefits, the patient voiced understanding and agreed to proceed.  Labs: none    Patient screened positive for depression based on a PHQ-9 score of 6 on 11/25/2024. Follow-up recommendations include: Prescribed antidepressant medication treatment and Suicide Risk Assessment performed.           TREATMENT PLAN/GOALS: Continue supportive psychotherapy efforts and medications as indicated. Treatment and medication options discussed during today's visit. Patient acknowledged and verbally consented to continue with current treatment plan and was educated on the importance of compliance with treatment and follow-up appointments.    MEDICATION ISSUES:  LUIS ANGEL reviewed as expected.  Discussed medication options and treatment plan of prescribed medication as well as the risks, benefits, and side effects including potential falls, possible impaired driving and metabolic adversities among others. Patient is agreeable to call the office with any worsening of symptoms or onset of side effects. Patient is agreeable to call 911 or go to the nearest ER should he/she begin having SI/HI. No medication side effects or related complaints today.     MEDS ORDERED DURING VISIT:  New Medications Ordered This Visit   Medications    sertraline (Zoloft) 25 MG tablet     Sig: Take 1 tablet by mouth Daily.     Dispense:  90 tablet     Refill:  1       Return in about 6 weeks (around 1/6/2025) for Video visit.         This document has been electronically signed by Kely Sloan MD  November  25, 2024 11:20 EST    Dictated Utilizing Dragon Dictation: Part of this note may be an electronic transcription/translation of spoken language to printed text using the Dragon Dictation System.

## 2024-11-25 NOTE — PATIENT INSTRUCTIONS
1.  Please return to clinic at your next scheduled visit.  Contact the clinic ([phone]) at least 24 hours prior in the event you need to cancel.  2.  Do no harm to yourself or others.    3.  Avoid alcohol and drugs.    4.  Take all medications as prescribed.  Please contact the clinic with any concerns. If you are in need of medication refills, please call the clinic at [phone].    5. Should you want to get in touch with your provider, Dr. Kely Sloan, please utilize [employer] or contact the office ([phone]), and staff will be able to page Dr. Sloan directly.  6.  In the event you have personal crisis, contact the following crisis numbers: Suicide Prevention Hotline 1-918.974.7142; [facility]IS[facility] Helpline 0-855-013-DKZL; [facility] [phone]; text HELLO to 603266; or 331.     Light box/Happy Light/Light Therapy: Obtain a light box, 10,000 lux, put the box about 1 foot away from you, facing you at an angle (not directly), use it daily, right after waking up, for 1 hour daily. Don't stare directly at it. Read, eat, watch tv, etc. Use from September through March. Call your insurance company to see if they can reimburse you for the cost. Available at big box retailers. I used [employer] to get a [employer] one.

## 2025-01-08 ENCOUNTER — TELEMEDICINE (OUTPATIENT)
Dept: PSYCHIATRY | Facility: CLINIC | Age: 55
End: 2025-01-08
Payer: COMMERCIAL

## 2025-01-08 DIAGNOSIS — F51.05 INSOMNIA DUE TO MENTAL CONDITION: ICD-10-CM

## 2025-01-08 DIAGNOSIS — F33.1 MAJOR DEPRESSIVE DISORDER, RECURRENT EPISODE, MODERATE: ICD-10-CM

## 2025-01-08 DIAGNOSIS — F41.1 GENERALIZED ANXIETY DISORDER: Primary | ICD-10-CM

## 2025-01-08 NOTE — PROGRESS NOTES
"Subjective   Smooth Tse is a 54 y.o. male who presents today for initial evaluation     Referring Provider:  No referring provider defined for this encounter.    Chief Complaint:  SAUD    History of Present Illness:     23: Initial Chart review:     Kofi: blank  Care Everywhere: none    Psychotropic medication chart review:  Present:  none    Previously:  Ativan  Trintellix 10 mg qday    EKG: none  Procedures: none  Head imaging: none  Labs: gluc 118 on cmp, reassuring thyroid studies, cbc. High ldl.    Initial Chart notes: Seen  by PCP. Wanted a referral to psych.      Chart review:   2025: no visits, GAD7 8.  2024: no visits.  24: teletherapy x5.  : no visits. Pt didn't tolerate the abilify (took it for a few days).    Plannin2024: Start light box, zoloft for MDD, SAUD. Watch sexual side effects (occurred in the past). Wellbutrin helped in the past as well (for years), but then it stopped helping.  24: Stable, well, no changes.  : Start hydroxyzine PRN for intermittent anger, per pts wishes. Referral to [facility] [place] for therapy related to anger that occcurs a few times a year.      Visits (Below):  \"Maynor\"   2025:   Mode of Visit: Video  Location of patient: -HOME-  Location of provider: +[facility]+  You have chosen to receive care through a telehealth visit.  The patient has signed the video visit consent form.  The visit included audio and video interaction. No technical issues occurred during this visit.  Interview:  \"Good.\"  I use the light box 4-5x a week  \"I don't like any medication, but... everything is working really well.\"  Taking between half a dose and a full 25 mg dose of zoloft.  \"I've been great. The last bad day was 12 days ago.\"  Less irritable  Mood/Depression: MDD depressed mood, seasonal -- improving  Anxiety: SAUD more irritable, on edge, excessive worrying -- improving  Panic attacks: none  Energy: down  Concentration: " "stable  Insomnia: stable  Eating: 169x3, 179 lbs  Refills: y  Substances: def  Therapy: was teri Cruz (Ravinder, next week)  Medication compliant: y  SE: n  No SI HI AVH.      2024:   Mode of Visit: Video  Location of patient: -HOME-  Location of provider: +[facility]+  You have chosen to receive care through a telehealth visit.  The patient has signed the video visit consent form.  The visit included audio and video interaction. No technical issues occurred during this visit.  Interview:  \"We've been working through some things.\"  I still get easily irritable  I tolerated zoloft in the past  I'm very sensitive to medications  Using light box  This month has been worse  Mostly work stresses  GAD7 8.  Mood/Depression: MDD depressed mood, seasonal  Anxiety: SAUD more irritable, on edge, excessive worrying  Panic attacks: n  Energy: down  Concentration: stable  Insomnia: stable  Eating: 169x2, 179 lbs  Refills: y  Substances: def  Therapy: was Dr. Romero, now Ayden (Ravinder), likes  Medication compliant: y  SE: n  No SI HI AVH.      2024:   In person interview:  \"It's going really well.\"  Likes the therapy, doing DBT  More peace.  Rarely uses hydroxyzine  It's been 8 mos since I had a blowup  Mood/Depression: stable MDD  Anxiety: stable SAUD   Panic attacks: n  Energy: stable  Concentration: stable  Sleeping: stable insomnia   Eatin, 179 lbs  Refills: y  Substances: def  Therapy: was teri Cruz  Medication compliant: y  SE: n  No SI HI AVH.      : In person interview:  Chart review:   : no visits. Pt didn't tolerate the abilify (took it for a few days).  Plannin/19: Drank for 9 years [age], 28 years. Treats insomnia with cannabis. Sober since . Start abilify. Pt reports that compliance migh be an issue. 4w  \"It intensified my anxiety and I felt out of it.\"  I'm not going to give things weeks and weeks to kick in.  Medication and me usually don't get " "along  Wellbutrin worked for a while, and then \"it got crumply in my brain... like plastic.\"  It was better for depression  But it increased the anxiety  It builds... then I explode, and then after I explode... I feel awesome for a while.  Mood/Depression: stable MDD  Anxiety: stable SAUD, except about 3-4x a year  Panic attacks: n  Energy: stable  Concentration: stable  Sleeping: stable insomnia   Eatin lbs  Refills: y  Substances: cannabis in the evenings  Therapy: n  Medication compliant: y  SE: n  No SI HI AVH.        : In person.  Interview:  Chart review:   His/Her Story: \"My main gist of being here is 10 min every 3 or 4 months.\"  P5, G11  That's when bad Maynor comes out  Most of the time I'm pretty laid back but things can slowly build over several months, then sometimes I can yell at the top of my lungs, or throw things.  I've hit a wall before.  Then it's over and I'm fine... but it builds again.  Last episode was September.  I also have \"depressive episodes\".  Going on my whole life. 11 yo that's when I felt \"this darkness come over me.\"  I saw my Mom weep during her episodes (maybe 15 min). Usually happy.  Her brother recently . He was known to have his sad times.  Growing up it was kindof cold, even though it was loving. You couldn't be really expressive.  I was living in a fantasy world to escape to a more exciting world  I used to lie, forge signatures for school  I drank at 16 yo, \"I needed alcohol.\" It turned me into this outgoing and fun charu.  Then drugs. I loved cocaine.  Depression/Mood: A little depressed   Depressed mood  Seasonal pattern: yes  Severity: Moderate  Duration: On and off for years since 11 yo  Anxiety:  Uncontrolled worrying, muscle tension, fatigue,  feeling on edge or restless, irritability, insomnia.  Severity: Moderate  Sense of doom  Duration: On and off for years since 11 yo  Panic attacks:  Psych ROS: Positive for depression, anxiety.  Negative for psychosis but " positive for hypomania.  ADHD: def  PTSD: def  No SI HI AVH.  Medication compliant: na  Has a therapist for 10 years. Nick.    Access to Firearms: denies    PHQ-9 Depression Screening  PHQ-9 Total Score:      Little interest or pleasure in doing things?     Feeling down, depressed, or hopeless?     Trouble falling or staying asleep, or sleeping too much?     Feeling tired or having little energy?     Poor appetite or overeating?     Feeling bad about yourself - or that you are a failure or have let yourself or your family down?     Trouble concentrating on things, such as reading the newspaper or watching television?     Moving or speaking so slowly that other people could have noticed? Or the opposite - being so fidgety or restless that you have been moving around a lot more than usual?     Thoughts that you would be better off dead, or of hurting yourself in some way?     PHQ-9 Total Score       SAUD-7       Past Surgical History:  Past Surgical History:   Procedure Laterality Date    VASECTOMY         Problem List:  Patient Active Problem List   Diagnosis    Anxiety disorder    Stress    Lipid screening       Allergy:   Allergies   Allergen Reactions    Ciprofloxacin Mental Status Change        Discontinued Medications:  There are no discontinued medications.        Current Medications:   Current Outpatient Medications   Medication Sig Dispense Refill    sertraline (Zoloft) 25 MG tablet Take 1 tablet by mouth Daily. 90 tablet 1     No current facility-administered medications for this visit.       Past Medical History:  Past Medical History:   Diagnosis Date    Anxiety     Depression        Past Psychiatric History:  Began Treatment: 15 years ago  Diagnoses: depression  Psychiatrist: multiple  Therapist: once a month  Admission History:Denies  Medication Trials:    Many    Pristiq, others    Wellbutrin: I got so spaced out.    Never put on a mood stabilizer    Self Harm: Denies  Suicide  "Attempts:Denies      Substance Abuse History:   Types: cannabis  Withdrawal Symptoms:Denies  Longest Period Sober:Not Applicable   AA: Not applicable     Social History:  Martial Status:  Employed:Yes  Kids:Yes  House:Lives in a house   History: Denies    Social History     Socioeconomic History    Marital status:    Tobacco Use    Smoking status: Never    Smokeless tobacco: Never   Vaping Use    Vaping status: Former   Substance and Sexual Activity    Alcohol use: Not Currently    Drug use: Yes     Types: Marijuana    Sexual activity: Yes     Partners: Female     Birth control/protection: Condom, Vasectomy       Family History: (1st degree)  Suicide Attempts: Denies  Suicide Completions:Denies      Family History   Problem Relation Age of Onset    Depression Mother     Cancer Mother     Anxiety disorder Mother     Depression Father     Cancer Father     Anxiety disorder Father        Developmental History:       Childhood: Denies Abuse, but my parents were older, Mom had me when she was 40, dad was 46  High School:Completed  College: 5 yr, no degree    Mental Status Exam  Appearance  : groomed, good eye contact, normal street clothes  Behavior  : pleasant and cooperative  Motor  : No abnormal  Speech  :normal rhythm, rate, volume, tone, not hyperverbal, not pressured, normal prosidy  Mood  : \"great\"  Affect  : euthymic, mood congruent, good variability  Thought Content  : negative suicidal ideations, negative homicidal ideations, negative obsessions  Perceptions  : negative auditory hallucinations, negative visual hallucinations  Thought Process  : linear  Insight/Judgement  : Fair/fair  Cognition  : grossly intact  Attention   : intact      Review of Systems:  Review of Systems   Constitutional:  Negative for diaphoresis and fatigue.   HENT:  Negative for drooling.    Eyes:  Negative for visual disturbance.   Respiratory:  Negative for cough and shortness of breath.    Cardiovascular:  " Negative for chest pain, palpitations and leg swelling.   Gastrointestinal:  Negative for nausea and vomiting.   Endocrine: Negative for cold intolerance and heat intolerance.   Genitourinary:  Negative for difficulty urinating.   Musculoskeletal:  Negative for joint swelling.   Allergic/Immunologic: Negative for immunocompromised state.   Neurological:  Negative for dizziness, seizures, speech difficulty and numbness.   Psychiatric/Behavioral:  Negative for confusion.        Physical Exam:  Physical Exam    Vital Signs:   There were no vitals taken for this visit.     Lab Results:   No visits with results within 6 Month(s) from this visit.   Latest known visit with results is:   Office Visit on 09/25/2023   Component Date Value Ref Range Status    Chlamydia trachomatis, RANDALL 09/25/2023 Negative  Negative Final    Gonococcus by RANDALL 09/25/2023 Negative  Negative Final    Trichomonas vaginosis 09/25/2023 Negative  Negative Final    Hepatitis B Surface Ag 09/25/2023 Non-Reactive  Non-Reactive Final    Hep A IgM 09/25/2023 Non-Reactive  Non-Reactive Final    Hep B C IgM 09/25/2023 Non-Reactive  Non-Reactive Final    Hepatitis C Ab 09/25/2023 Non-Reactive  Non-Reactive Final    HIV DUO 09/25/2023 Non-Reactive  Non-Reactive Final    HSV 1 IgG, Type Specific 09/25/2023 24.60 (H)  0.00 - 0.90 index Final                                     Negative        <0.91                                   Equivocal 0.91 - 1.09                                   Positive        >1.09   Note: Negative indicates no antibodies detected to   HSV-1. Equivocal may suggest early infection.  If   clinically appropriate, retest at later date. Positive   indicates antibodies detected to HSV-1.    HSV 2 IgG, Type Spec 09/25/2023 <0.91  0.00 - 0.90 index Final                                     Negative        <0.91                                   Equivocal 0.91 - 1.09                                   Positive        >1.09   HSV-2 Antibody  Interpretation: Negative indicates no   detectable antibodies to HSV-2 were found. If recent   exposure is suspected, retest in 4-6 weeks. Equivocal   samples should be retested in 4-6 weeks. Positive   indicates the presence of detectable IgG antibody to   HSV-2. False positive results may occur. Repeat   testing, or testing by a different method, may be   indicated in some settings (e.g. patients with low   likelihood of HSV infection). If clinically   appropriate, retest 4-6 weeks later.    RPR 09/25/2023 Non-Reactive  Non-Reactive Final    Hepatitis C Quantitation 09/25/2023 HCV Not Detected  IU/mL Final    Test Information 09/25/2023 Comment   Final    The quantitative range of this assay is 15 IU/mL to 100 million IU/mL.       EKG Results:  No orders to display       Imaging Results:  No Images in the past 120 days found..      Assessment & Plan   Diagnoses and all orders for this visit:    1. Generalized anxiety disorder (Primary)    2. Major depressive disorder, recurrent episode, moderate    3. Insomnia due to mental condition        Visit Diagnoses:    ICD-10-CM ICD-9-CM   1. Generalized anxiety disorder  F41.1 300.02   2. Major depressive disorder, recurrent episode, moderate  F33.1 296.32   3. Insomnia due to mental condition  F51.05 300.9     327.02     01/08/2025: Improved, fairly stable, no changes, 2m.    11/25/2024: Start light box, zoloft for MDD, SAUD. Watch sexual side effects (occurred in the past). Wellbutrin helped in the past as well (for years), but then it stopped helping.    5/17/24: Stable, well, no changes.    2/13: Start hydroxyzine PRN for intermittent anger, per pts wishes. Referral to [place] for therapy related to anger that occcurs a few times a year.    12/19: Drank for 9 years 87-14, 28 years. Treats insomnia with cannabis. Sober since 2014. Start abilify. Pt reports that compliance migh be an issue. 4w    PLAN:  Safety: No acute safety concerns  Therapy: Currently Seeing a  Therapist  Risk Assessment: Risk of self-harm acutely is moderate.  Risk factors include anxiety disorder, mood disorder, some AODA, and recent psychosocial stressors (pandemic). Protective factors include no family history, denies access to guns/weapons, no present SI, no history of suicide attempts or self-harm in the past, minimal AODA, healthcare seeking, future orientation, willingness to engage in care.  Risk of self-harm chronically is also moderate, but could be further elevated in the event of treatment noncompliance and/or AODA.  Meds:  CONTINUE light box sept thru mar  CONTINUE zoloft 25 mg qday. Risks, benefits, alternatives discussed with patient including GI upset, nausea vomiting diarrhea, hyponatremia, theoretical decrease of seizure threshold predisposing the patient to a slightly higher seizure risk, headaches, sexual dysfunction, serotonin syndrome, bleeding risk, increased suicidality in patients 24 years and younger, switching to gaye/hypomania.  After discussion of these risks and benefits, the patient voiced understanding and agreed to proceed.  CONTINUE hydroxyzine 25 mg daily prn anxiety. Risks, benefits, alternatives discussed with patient including sedation, dizziness, fall risk, GI upset, and risk of increased CNS depression and elevated heart rate if taken with other antihistamines.  Use care when operating vehicle, vessel, or machine. After discussion of these risks and benefits, the patient voiced understanding and agreed to proceed.  S/P:  abilify 2 mg qhs. No help 5/24  Labs: none    Patient screened positive for depression based on a PHQ-9 score of 6 on 11/25/2024. Follow-up recommendations include: Prescribed antidepressant medication treatment and Suicide Risk Assessment performed.           TREATMENT PLAN/GOALS: Continue supportive psychotherapy efforts and medications as indicated. Treatment and medication options discussed during today's visit. Patient acknowledged and  verbally consented to continue with current treatment plan and was educated on the importance of compliance with treatment and follow-up appointments.    MEDICATION ISSUES:  LUIS ANGEL reviewed as expected.  Discussed medication options and treatment plan of prescribed medication as well as the risks, benefits, and side effects including potential falls, possible impaired driving and metabolic adversities among others. Patient is agreeable to call the office with any worsening of symptoms or onset of side effects. Patient is agreeable to call 911 or go to the nearest ER should he/she begin having SI/HI. No medication side effects or related complaints today.     MEDS ORDERED DURING VISIT:  No orders of the defined types were placed in this encounter.      Return in about 2 months (around 3/8/2025) for Video visit.         This document has been electronically signed by Kely Sloan MD  January 8, 2025 11:08 EST    Dictated Utilizing Dragon Dictation: Part of this note may be an electronic transcription/translation of spoken language to printed text using the Dragon Dictation System.

## 2025-01-15 ENCOUNTER — TELEMEDICINE (OUTPATIENT)
Dept: PSYCHIATRY | Facility: CLINIC | Age: 55
End: 2025-01-15
Payer: COMMERCIAL

## 2025-01-15 DIAGNOSIS — F41.1 GENERALIZED ANXIETY DISORDER: Primary | ICD-10-CM

## 2025-01-15 DIAGNOSIS — F33.1 MAJOR DEPRESSIVE DISORDER, RECURRENT EPISODE, MODERATE: ICD-10-CM

## 2025-01-15 NOTE — PROGRESS NOTES
Date: January 15, 2025  Time In: 09:01 EST  Time out: 09:50 EST      This provider is located at [facility] [address] [place], [place], [facility], using a secure [employer] Video Visit through [employer]. Patient is being seen remotely via telehealth at their home address is located in [place]. Patient stated they are in a secure environment for this session. The patient's condition being diagnosed and treated is appropriate for telemedicine. The provider identified themself as well as their credentials. The patient, or  patient's legal guardian consent to be seen remotely, and when consent is given they understand that the consent allows for patient identifiable information to be sent to a third party as needed. They may refuse to be seen remotely at any time. The electronic data is encrypted and password protected, and the patient's or  legal guardian has been advised of the potential risks to privacy not withstanding such measures.   PT Identifiers used: Name and .    You have chosen to receive care through a telehealth visit.  Do you consent to use a video/audio connection for your medical care today? Yes     Subjective   Smooth Tse is a 54 y.o. male who presents today for follow up    Chief Complaint: Anxiety and depression      History of Present Illness: Client discussed how things have been since last session.  He reports that he and his med provider have been working on adjusting medication dosage.  He discussed that he still finds the negative thoughts occurring at random times, but he feels that he has been working on letting go of the thoughts quicker and spending less time processing  Client and therapist discussed radical acceptance.  Client given homework assignment in terms of reading and completing sheet about radical acceptance by his next session date        Clinical Maneuvering/Intervention:    On a scale 0-10 ( with 10 being the worst)  Anxiety: 6/10  Depression:  5/10    Sleep:  Still disrupted at times    Appetite:  client reports that appetite has been decreased recently      Assisted patient in processing above session content; acknowledged and normalized patient’s thoughts, feelings, and concerns.  Rationalized patient thought process regarding concerns presented at session.  Discussed triggers associated with patient's  anxiety  and depression  Also discussed coping skills for patient to implement such as  processing negative thoughts    Allowed patient to freely discuss issues without interruption or judgment. Provided safe, confidential environment to facilitate the development of positive therapeutic relationship and encourage open, honest communication. Assisted patient in identifying risk factors which would indicate the need for higher level of care including thoughts to harm self or others and/or self-harming behavior and encouraged patient to contact this office, call 911, or present to the nearest emergency room should any of these events occur. Discussed crisis intervention services and means to access. Patient adamantly and convincingly denies current suicidal or homicidal ideation or perceptual disturbance.    Assessment:     Patient appears to maintain relative stability as compared to their baseline.  However, patient continues to struggle with anxiety and depression  which continues to cause impairment in important areas of functioning.  A result, they can be reasonably expected to continue to benefit from treatment and would likely be at increased risk for decompensation otherwise.      PHQ-9 Depression Screening  Little interest or pleasure in doing things?  3   Feeling down, depressed, or hopeless?  2   PHQ-2 Total Score     Trouble falling or staying asleep, or sleeping too much?  0   Feeling tired or having little energy?  2   Poor appetite or overeating?  3   Feeling bad about yourself - or that you are a failure or have let yourself or your family  down?  1   Trouble concentrating on things, such as reading the newspaper or watching television?  2   Moving or speaking so slowly that other people could have noticed? Or the opposite - being so fidgety or restless that you have been moving around a lot more than usual?  3   Thoughts that you would be better off dead, or of hurting yourself in some way?  0   PHQ-9 Total Score  16   If you checked off any problems, how difficult have these problems made it for you to do your work, take care of things at home, or get along with other people?  Difficult         SAUD-7   Feeling nervous, anxious or on edge?  2   Not being able to stop or control worrying?  2   Worrying too much about different things?  2   Trouble Relaxing  3   Being so restless that it is hard to sit still  1   Becoming easily annoyed or irritable  1   Feeling afraid as if something awful might happen?  1   SAUD Total Score  12   If you checked any problesm, how difficult have these problems made it for you to do your work, take care of things at home, or get along with other people?  difficult          Mental Status Exam:   Hygiene:   good  Cooperation:  Cooperative  Eye Contact:  Good  Psychomotor Behavior:  Appropriate  Affect:  Restricted  Mood: sad, depressed, and anxious  Speech:  Normal  Thought Process:  Goal directed  Thought Content:  Mood congruent  Suicidal:  None  Homicidal:  None  Hallucinations:  None  Delusion:  None  Memory:  Intact  Orientation:  Person, Place, Time, and Situation  Reliability:  fair  Insight:  Fair  Judgement:  Fair  Impulse Control:  Good  Physical/Medical Issues:   No current changes reported        Patient's Support Network Includes:  wife and children    Functional Status: Moderate impairment     Progress toward goal: Not at goal    Prognosis: Fair with Ongoing Treatment         Plan:    Patient will continue in individual outpatient therapy with focus on improved functioning and coping skills, maintaining  stability, and avoiding decompensation and the need for higher level of care.    Patient will adhere to medication regimen as prescribed and report any side effects. Patient will contact this office, call 911 or present to the nearest emergency room should suicidal or homicidal ideations occur. Provide Cognitive Behavioral Therapy and Solution Focused Therapy to improve functioning, maintain stability, and avoid decompensation and the need for higher level of care.     Return in about 2 weeks (around 1/29/2025).      VISIT DIAGNOSIS:    Diagnosis Plan   1. Generalized anxiety disorder        2. Major depressive disorder, recurrent episode, moderate               This document has been electronically signed by Louise Castellon LCSW  January 15, 2025      Part of this note may be an electronic transcription/translation of spoken language to printed text using the Dragon Dictation System.

## 2025-01-27 ENCOUNTER — TELEPHONE (OUTPATIENT)
Dept: PSYCHIATRY | Facility: CLINIC | Age: 55
End: 2025-01-27

## 2025-02-20 ENCOUNTER — TELEMEDICINE (OUTPATIENT)
Dept: PSYCHIATRY | Facility: CLINIC | Age: 55
End: 2025-02-20
Payer: COMMERCIAL

## 2025-02-20 DIAGNOSIS — F41.1 GENERALIZED ANXIETY DISORDER: Primary | ICD-10-CM

## 2025-02-20 DIAGNOSIS — F33.1 MAJOR DEPRESSIVE DISORDER, RECURRENT EPISODE, MODERATE: ICD-10-CM

## 2025-02-20 NOTE — PROGRESS NOTES
"Date: 2025  Time In: 10:01 EST  Time out: 10:35 EST      This provider is located at [facility], [address], [place], [place], [facility], using a secure [employer] Video Visit through [employer]. Patient is being seen remotely via telehealth at [address] ( [facility]) in [facility]. Patient stated they are in a secure environment for this session. The patient's condition being diagnosed and treated is appropriate for telemedicine. The provider identified themself as well as their credentials. The patient, or  patient's legal guardian consent to be seen remotely, and when consent is given they understand that the consent allows for patient identifiable information to be sent to a third party as needed. They may refuse to be seen remotely at any time. The electronic data is encrypted and password protected, and the patient's or  legal guardian has been advised of the potential risks to privacy not withstanding such measures.   PT Identifiers used: Name and .    You have chosen to receive care through a telehealth visit.  Do you consent to use a video/audio connection for your medical care today? Yes     Subjective   Smooth Tse is a 54 y.o. male who presents today for follow up    Chief Complaint: Anxiety and Depression     History of Present Illness: Client discussed how things have been since last session.  He reports that he has had the flu and that is why he missed his last session.  He reports that he is traveling again for work.  Client expressed that his mood has been pretty level, with the exception of when he was taking steroids for the flu.  He states that he did notice an increase in irritation while he was on the steroid.  He states that he does not like \"losing my cool\". He states that he has done pretty well at home.    Client and therapist discussed how he handles solving problems when things arise.  Discussed that he can change the situation, learn " "to regulate his emotions, tolerate the problem and his response or to stay miserable.  He feels that in the past he would have said that he stayed miserable.  He recently has been working on accepting his emotions and experiencing them rather than \"fighting them\".   Client will be out of town on business for several weeks in march, follow up scheduled in april      Clinical Maneuvering/Intervention:    On a scale 0-10 ( with 10 being the worst)  Anxiety: 3/10 currently ( reports that it was probably a 9/10  while he was on the steroid for the flu, but states that he was able to realize that it was the med that was causing the increase in what he was feeling and to challenge and bring himself back down)  Depression: 4/10 currently    Sleep:  No problems with sleep at this time    Appetite: He states that his appetite is about the same      Assisted patient in processing above session content; acknowledged and normalized patient’s thoughts, feelings, and concerns.  Rationalized patient thought process regarding concerns presented at session.  Discussed triggers associated with patient's  anxiety  and depression  Also discussed coping skills for patient to implement such as positive self talk     Allowed patient to freely discuss issues without interruption or judgment. Provided safe, confidential environment to facilitate the development of positive therapeutic relationship and encourage open, honest communication. Assisted patient in identifying risk factors which would indicate the need for higher level of care including thoughts to harm self or others and/or self-harming behavior and encouraged patient to contact this office, call 911, or present to the nearest emergency room should any of these events occur. Discussed crisis intervention services and means to access. Patient adamantly and convincingly denies current suicidal or homicidal ideation or perceptual disturbance.    Assessment:     Patient appears to " maintain relative stability as compared to their baseline.  However, patient continues to struggle with anxiety and depression which continues to cause impairment in important areas of functioning.  A result, they can be reasonably expected to continue to benefit from treatment and would likely be at increased risk for decompensation otherwise.           Mental Status Exam:   Hygiene:   fair  Cooperation:  Cooperative  Eye Contact:  Good  Psychomotor Behavior:  Appropriate  Affect:  Appropriate  Mood: normal  Speech:  Normal  Thought Process:  Goal directed and Linear  Thought Content:  Mood congruent  Suicidal:  None  Homicidal:  None  Hallucinations:  None  Delusion:  None  Memory:  Intact  Orientation:  Person, Place, Time, and Situation  Reliability:  good  Insight:  Fair  Judgement:  Good  Impulse Control:  Good  Physical/Medical Issues:   No current changes reported at this time        Patient's Support Network Includes:  wife, children, and extended family    Functional Status: Mild impairment     Progress toward goal: Not at goal    Prognosis: Fair with Ongoing Treatment         Plan:    Patient will continue in individual outpatient therapy with focus on improved functioning and coping skills, maintaining stability, and avoiding decompensation and the need for higher level of care.    Patient will adhere to medication regimen as prescribed and report any side effects. Patient will contact this office, call 911 or present to the nearest emergency room should suicidal or homicidal ideations occur. Provide Cognitive Behavioral Therapy and Solution Focused Therapy to improve functioning, maintain stability, and avoid decompensation and the need for higher level of care.     No follow-ups on file.      VISIT DIAGNOSIS:    Diagnosis Plan   1. Generalized anxiety disorder        2. Major depressive disorder, recurrent episode, moderate               This document has been electronically signed by Louise Castellon  [facility]  February 20, 2025      Part of this note may be an electronic transcription/translation of spoken language to printed text using the Dragon Dictation System.

## 2025-03-13 ENCOUNTER — TELEPHONE (OUTPATIENT)
Dept: PSYCHIATRY | Facility: CLINIC | Age: 55
End: 2025-03-13

## 2025-03-13 ENCOUNTER — TELEMEDICINE (OUTPATIENT)
Dept: PSYCHIATRY | Facility: CLINIC | Age: 55
End: 2025-03-13
Payer: COMMERCIAL

## 2025-03-13 DIAGNOSIS — F41.1 GENERALIZED ANXIETY DISORDER: Primary | ICD-10-CM

## 2025-03-13 DIAGNOSIS — F33.1 MAJOR DEPRESSIVE DISORDER, RECURRENT EPISODE, MODERATE: ICD-10-CM

## 2025-03-13 DIAGNOSIS — R41.840 ATTENTION AND CONCENTRATION DEFICIT: ICD-10-CM

## 2025-03-13 DIAGNOSIS — F51.05 INSOMNIA DUE TO MENTAL CONDITION: ICD-10-CM

## 2025-03-13 RX ORDER — SERTRALINE HYDROCHLORIDE 25 MG/1
50 TABLET, FILM COATED ORAL DAILY
Qty: 180 TABLET | Refills: 1 | Status: SHIPPED | OUTPATIENT
Start: 2025-03-13

## 2025-03-13 NOTE — TREATMENT PLAN
Anxiety:  3/10 progressing    Goals:  Patient will develop and implement behavioral and cognitive strategies to reduce anxiety and irrational fears, monthly, using Rogerian psychotherapy and CBT where appropriate.  Help patient explore past emotional issues in relation to present anxiety, monthly, until remission of symptoms, using Rogerian psychotherapy and CBT where appropriate.  Help patient develop an awareness of their cognitive and physical responses to anxiety, monthly, until remission of symptoms, using Rogerian psychotherapy and CBT where appropriate.          Depression:  4/10 progressing    Goals:  Patient will demonstrate the ability to initiate new constructive life skills outside of sessions on a consistent basis, monthly, using Rogerian psychotherapy and CBT where appropriate.  Assist patient in setting attainable activities of daily living goals, monthly, using Rogerian psychotherapy and CBT where appropriate.  Provide education about depression, monthly, using Rogerian psychotherapy and CBT where appropriate.  Assist patient in developing healthy coping strategies, monthly, using Rogerian psychotherapy and CBT where appropriate.    Rogerian psychotherapy and CBT will be used to help accomplish the above goals and manage depression and anxiety related to  work stresses, family well-being      I have discussed and reviewed this treatment plan with the patient.      Reviewed by Kely Sloan MD   03/13/2025

## 2025-03-13 NOTE — PROGRESS NOTES
"Date: 2025  Time In: 13:01 EDT  Time out: 13:42 EDT      This provider is located at [facility], [facility] [place], [place], [facility], using a secure MyChart Video Visit through [employer]. Patient is being seen remotely via telehealth at their home address is located in [place]. Patient stated they are in a secure environment for this session. The patient's condition being diagnosed and treated is appropriate for telemedicine. The provider identified themself as well as their credentials. The patient, or  patient's legal guardian consent to be seen remotely, and when consent is given they understand that the consent allows for patient identifiable information to be sent to a third party as needed. They may refuse to be seen remotely at any time. The electronic data is encrypted and password protected, and the patient's or  legal guardian has been advised of the potential risks to privacy not withstanding such measures.   PT Identifiers used: Name and .    You have chosen to receive care through a telehealth visit.  Do you consent to use a video/audio connection for your medical care today? Yes     Subjective   Smooth Tse is a 54 y.o. male who presents today for follow up    Chief Complaint: Anxiety and depression     History of Present Illness: Client discussed how things have been since last session.  He reports that he and his med provider are looking at making some adjustments to medication.  He reports that since January of this year, he has found that he has little enjoyment even in things that typically make him feel happy.  \"I just don't care about anything\". He states that he does force himself to get out, but typically he does not want to get out and or do anything.          Clinical Maneuvering/Intervention:    On a scale 0-10 ( with 10 being the worst)  Anxiety: 6/10  Depression: 7/10    Sleep:  client reports that sleep is still pretty good but since he had the " flu and with time change it has not been as good, but states that it stilll better than it had been for a while    Appetite:  Client reports that appetite has not been good      Assisted patient in processing above session content; acknowledged and normalized patient’s thoughts, feelings, and concerns.  Rationalized patient thought process regarding concerns presented at session.  Discussed triggers associated with patient's  anxiety  and depression  Also discussed coping skills for patient to implement such as self care  and positive self talk     Allowed patient to freely discuss issues without interruption or judgment. Provided safe, confidential environment to facilitate the development of positive therapeutic relationship and encourage open, honest communication. Assisted patient in identifying risk factors which would indicate the need for higher level of care including thoughts to harm self or others and/or self-harming behavior and encouraged patient to contact this office, call 911, or present to the nearest emergency room should any of these events occur. Discussed crisis intervention services and means to access. Patient adamantly and convincingly denies current suicidal or homicidal ideation or perceptual disturbance.    Assessment:     Patient appears to maintain relative stability as compared to their baseline.  However, patient continues to struggle with anxiety and depression  which continues to cause impairment in important areas of functioning.  A result, they can be reasonably expected to continue to benefit from treatment and would likely be at increased risk for decompensation otherwise.      PHQ-9 Depression Screening  Little interest or pleasure in doing things?  3   Feeling down, depressed, or hopeless?  3   PHQ-2 Total Score  6   Trouble falling or staying asleep, or sleeping too much?  1   Feeling tired or having little energy?  0   Poor appetite or overeating?  3   Feeling bad about  yourself - or that you are a failure or have let yourself or your family down?  3   Trouble concentrating on things, such as reading the newspaper or watching television?  1   Moving or speaking so slowly that other people could have noticed? Or the opposite - being so fidgety or restless that you have been moving around a lot more than usual?  3   Thoughts that you would be better off dead, or of hurting yourself in some way?  0   PHQ-9 Total Score  17 ( per patient report)   If you checked off any problems, how difficult have these problems made it for you to do your work, take care of things at home, or get along with other people?  Difficult         SAUD-7   Feeling nervous, anxious or on edge?  3   Not being able to stop or control worrying?  0   Worrying too much about different things?  0   Trouble Relaxing  3   Being so restless that it is hard to sit still  3   Becoming easily annoyed or irritable  3   Feeling afraid as if something awful might happen?  1   SAUD Total Score  13 ( per patient report)   If you checked any problesm, how difficult have these problems made it for you to do your work, take care of things at home, or get along with other people?  Difficult          Mental Status Exam:   Hygiene:   good  Cooperation:  Cooperative  Eye Contact:  Good  Psychomotor Behavior:  Appropriate  Affect:  Restricted  Mood: sad, depressed, and anxious  Speech:  Normal  Thought Process:  Goal directed and Linear  Thought Content:  Mood congruent  Suicidal:  None  Homicidal:  None  Hallucinations:  None  Delusion:  None  Memory:   client reports that he feels he has noticed some increasing problems with his memory  Orientation:  Person, Place, Time, and Situation  Reliability:  good  Insight:  Good  Judgement:  Good  Impulse Control:  Good  Physical/Medical Issues:   Had the flu since last session         Patient's Support Network Includes:  wife, children, and extended family    Functional Status: Moderate  impairment     Progress toward goal: Not at goal    Prognosis: Fair with Ongoing Treatment         Plan:    Patient will continue in individual outpatient therapy with focus on improved functioning and coping skills, maintaining stability, and avoiding decompensation and the need for higher level of care.    Patient will adhere to medication regimen as prescribed and report any side effects. Patient will contact this office, call 911 or present to the nearest emergency room should suicidal or homicidal ideations occur. Provide Cognitive Behavioral Therapy and Solution Focused Therapy to improve functioning, maintain stability, and avoid decompensation and the need for higher level of care.     Return in about 3 weeks (around 4/3/2025).      VISIT DIAGNOSIS:    Diagnosis Plan   1. Generalized anxiety disorder        2. Major depressive disorder, recurrent episode, moderate               This document has been electronically signed by Louise Castellon LCSW  March 13, 2025      Part of this note may be an electronic transcription/translation of spoken language to printed text using the Dragon Dictation System.

## 2025-03-13 NOTE — PROGRESS NOTES
"Subjective   Smooth Tse is a 54 y.o. male who presents today for initial evaluation     Referring Provider:  No referring provider defined for this encounter.    Chief Complaint:  SAUD    History of Present Illness:     23: Initial Chart review:     Kofi: blank  Care Everywhere: none    Psychotropic medication chart review:  Present:  none    Previously:  Ativan  Trintellix 10 mg qday    EKG: none  Procedures: none  Head imaging: none  Labs: gluc 118 on cmp, reassuring thyroid studies, cbc. High ldl.    Initial Chart notes: Seen  by PCP. Wanted a referral to psych.      Chart review:   2025: [facility] x3, no acute on CXR.  2025: no visits, GAD7 8.  2024: no visits.  24: teletherapy x5.  : no visits. Pt didn't tolerate the abilify (took it for a few days).    Plannin2025: Improved, fairly stable, no changes, 2m.  2024: Start light box, zoloft for MDD, SAUD. Watch sexual side effects (occurred in the past). Wellbutrin helped in the past as well (for years), but then it stopped helping.  24: Stable, well, no changes.      Visits (Below):  \"Maynor\"  Denies Fhx of ADHD  States he was tested for ADHD in the past, tested high   2025:   Mode of Visit: Video  Location of patient: -HOME-  Location of provider: [facility]  You have chosen to receive care through a telehealth visit.  The patient has signed the video visit consent form.  The visit included audio and video interaction. No technical issues occurred during this visit.  Interview:  \"Pretty good.\"  Not using the light box  No explosions  But not motivated  No energy  Declines med changes  Some irritability  Tested positive for ADHD in the past  ADHD:   Elementary school:   Grades? perfect  Special classes or failures? Denies, gifted and talented  Got in trouble? Talking and getting out of his seat, class clown  Referral for ADHD testing? denies  FHx: denies  Presently:  I'm very " "organized  No forgetting, losing things (has a place for everything)  Problems with attention to detail, problems with sustained attention, problems listening when spoken to directly, failure to finish tasks, avoids tasks that require sustained mental effort, easily distracted, talks a lot and cutting people off, drifts off during conversation  Mood/Depression: MDD some mild anhedonia, seasonal  Anxiety: SAUD some irritability  Panic attacks: none  Energy: down  Concentration: chronically poor  Insomnia: stable  Eating: 169x3, 179 lbs  Refills: y  Substances: def  Therapy: was teri Cruz ([facility], next week)  Medication compliant: y  SE: n  No SI HI AVH.      01/08/2025:   Mode of Visit: Video  Location of patient: -HOME-  Location of provider: +[facility]+  You have chosen to receive care through a telehealth visit.  The patient has signed the video visit consent form.  The visit included audio and video interaction. No technical issues occurred during this visit.  Interview:  \"Good.\"  I use the light box 4-5x a week  \"I don't like any medication, but... everything is working really well.\"  Taking between half a dose and a full 25 mg dose of zoloft.  \"I've been great. The last bad day was 12 days ago.\"  Less irritable  Mood/Depression: MDD depressed mood, seasonal -- improving  Anxiety: SAUD more irritable, on edge, excessive worrying -- improving  Panic attacks: none  Energy: down  Concentration: stable  Insomnia: stable  Eating: 169x3, 179 lbs  Refills: y  Substances: def  Therapy: was teri Cruz ([facility], next week)  Medication compliant: y  SE: n  No SI HI AVH.      11/25/2024:   Mode of Visit: Video  Location of patient: -HOME-  Location of provider: +[facility]+  You have chosen to receive care through a telehealth visit.  The patient has signed the video visit consent form.  The visit included audio and video interaction. No technical issues occurred during this visit.  Interview:  \"We've " "been working through some things.\"  I still get easily irritable  I tolerated zoloft in the past  I'm very sensitive to medications  Using light box  This month has been worse  Mostly work stresses  GAD7 8.  Mood/Depression: MDD depressed mood, seasonal  Anxiety: SAUD more irritable, on edge, excessive worrying  Panic attacks: n  Energy: down  Concentration: stable  Insomnia: stable  Eating: 169x2, 179 lbs  Refills: y  Substances: def  Therapy: was Dr. Romero, now [facility] ([facility]), likes  Medication compliant: y  SE: n  No SI HI AVH.    ...      : In person.  Interview:  Chart review:   His/Her Story: \"My main gist of being here is 10 min every 3 or 4 months.\"  P5, G11  That's when bad Maynor comes out  Most of the time I'm pretty laid back but things can slowly build over several months, then sometimes I can yell at the top of my lungs, or throw things.  I've hit a wall before.  Then it's over and I'm fine... but it builds again.  Last episode was September.  I also have \"depressive episodes\".  Going on my whole life. 11 yo that's when I felt \"this darkness come over me.\"  I saw my Mom weep during her episodes (maybe 15 min). Usually happy.  Her brother recently . He was known to have his sad times.  Growing up it was kindof cold, even though it was loving. You couldn't be really expressive.  I was living in a fantasy world to escape to a more exciting world  I used to lie, forge signatures for school  I drank at 16 yo, \"I needed alcohol.\" It turned me into this outgoing and fun charu.  Then drugs. I loved cocaine.  Depression/Mood: A little depressed   Depressed mood  Seasonal pattern: yes  Severity: Moderate  Duration: On and off for years since 11 yo  Anxiety:  Uncontrolled worrying, muscle tension, fatigue,  feeling on edge or restless, irritability, insomnia.  Severity: Moderate  Sense of doom  Duration: On and off for years since 11 yo  Panic attacks:  Psych ROS: Positive for depression, anxiety.  Negative for " psychosis but positive for hypomania.  ADHD: def  PTSD: def  No SI HI AVH.  Medication compliant: na  Has a therapist for 10 years. Nick.    Access to Firearms: denies    PHQ-9 Depression Screening  PHQ-9 Total Score:      Little interest or pleasure in doing things?     Feeling down, depressed, or hopeless?     Trouble falling or staying asleep, or sleeping too much?     Feeling tired or having little energy?     Poor appetite or overeating?     Feeling bad about yourself - or that you are a failure or have let yourself or your family down?     Trouble concentrating on things, such as reading the newspaper or watching television?     Moving or speaking so slowly that other people could have noticed? Or the opposite - being so fidgety or restless that you have been moving around a lot more than usual?     Thoughts that you would be better off dead, or of hurting yourself in some way?     PHQ-9 Total Score       SAUD-7       Past Surgical History:  Past Surgical History:   Procedure Laterality Date    VASECTOMY         Problem List:  Patient Active Problem List   Diagnosis    Anxiety disorder    Stress    Lipid screening       Allergy:   Allergies   Allergen Reactions    Ciprofloxacin Mental Status Change        Discontinued Medications:  Medications Discontinued During This Encounter   Medication Reason    sertraline (Zoloft) 25 MG tablet Reorder           Current Medications:   Current Outpatient Medications   Medication Sig Dispense Refill    sertraline (Zoloft) 25 MG tablet Take 2 tablets by mouth Daily. 180 tablet 1     No current facility-administered medications for this visit.       Past Medical History:  Past Medical History:   Diagnosis Date    Anxiety     Depression        Past Psychiatric History:  Began Treatment: 15 years ago  Diagnoses: depression  Psychiatrist: multiple  Therapist: once a month  Admission History:Denies  Medication Trials:    Many    Pristiq, others    Wellbutrin: I got so spaced  "out.    Never put on a mood stabilizer    Self Harm: Denies  Suicide Attempts:Denies      Substance Abuse History:   Types: cannabis  Withdrawal Symptoms:Denies  Longest Period Sober:Not Applicable   AA: Not applicable     Social History:  Martial Status:  Employed:Yes  Kids:Yes  House:Lives in a house   History: Denies    Social History     Socioeconomic History    Marital status:    Tobacco Use    Smoking status: Never    Smokeless tobacco: Never   Vaping Use    Vaping status: Former   Substance and Sexual Activity    Alcohol use: Not Currently    Drug use: Yes     Types: Marijuana    Sexual activity: Yes     Partners: Female     Birth control/protection: Condom, Vasectomy       Family History: (1st degree)  Suicide Attempts: Denies  Suicide Completions:Denies      Family History   Problem Relation Age of Onset    Depression Mother     Cancer Mother     Anxiety disorder Mother     Depression Father     Cancer Father     Anxiety disorder Father        Developmental History:       Childhood: Denies Abuse, but my parents were older, Mom had me when she was 40, dad was 46  High School:Completed  College: 5 yr, no degree    Mental Status Exam  Appearance  : groomed, good eye contact, normal street clothes  Behavior  : pleasant and cooperative  Motor  : No abnormal  Speech  : talkative, interrupting, normal rhythm, rate, volume, tone, not hyperverbal, not pressured, normal prosidy  Mood  : \"I don't enjoy anything\"  Affect  : euthymic, mood congruent, good variability  Thought Content  : negative suicidal ideations, negative homicidal ideations, negative obsessions  Perceptions  : negative auditory hallucinations, negative visual hallucinations  Thought Process  : linear  Insight/Judgement  : Fair/fair  Cognition  : grossly intact  Attention   : intact      Review of Systems:  Review of Systems   Constitutional:  Negative for diaphoresis and fatigue.   HENT:  Negative for drooling.    Eyes:  " Negative for visual disturbance.   Respiratory:  Negative for cough and shortness of breath.    Cardiovascular:  Negative for chest pain, palpitations and leg swelling.   Gastrointestinal:  Negative for nausea and vomiting.   Endocrine: Negative for cold intolerance and heat intolerance.   Genitourinary:  Negative for difficulty urinating.   Musculoskeletal:  Negative for joint swelling.   Allergic/Immunologic: Negative for immunocompromised state.   Neurological:  Negative for dizziness, seizures, speech difficulty and numbness.   Psychiatric/Behavioral:  Negative for confusion.        Physical Exam:  Physical Exam    Vital Signs:   There were no vitals taken for this visit.     Lab Results:   No visits with results within 6 Month(s) from this visit.   Latest known visit with results is:   Office Visit on 09/25/2023   Component Date Value Ref Range Status    Chlamydia trachomatis, RANDALL 09/25/2023 Negative  Negative Final    Gonococcus by RANDALL 09/25/2023 Negative  Negative Final    Trichomonas vaginosis 09/25/2023 Negative  Negative Final    Hepatitis B Surface Ag 09/25/2023 Non-Reactive  Non-Reactive Final    Hep A IgM 09/25/2023 Non-Reactive  Non-Reactive Final    Hep B C IgM 09/25/2023 Non-Reactive  Non-Reactive Final    Hepatitis C Ab 09/25/2023 Non-Reactive  Non-Reactive Final    HIV DUO 09/25/2023 Non-Reactive  Non-Reactive Final    HSV 1 IgG, Type Specific 09/25/2023 24.60 (H)  0.00 - 0.90 index Final                                     Negative        <0.91                                   Equivocal 0.91 - 1.09                                   Positive        >1.09   Note: Negative indicates no antibodies detected to   HSV-1. Equivocal may suggest early infection.  If   clinically appropriate, retest at later date. Positive   indicates antibodies detected to HSV-1.    HSV 2 IgG, Type Spec 09/25/2023 <0.91  0.00 - 0.90 index Final                                     Negative        <0.91                                    Equivocal 0.91 - 1.09                                   Positive        >1.09   HSV-2 Antibody Interpretation: Negative indicates no   detectable antibodies to HSV-2 were found. If recent   exposure is suspected, retest in 4-6 weeks. Equivocal   samples should be retested in 4-6 weeks. Positive   indicates the presence of detectable IgG antibody to   HSV-2. False positive results may occur. Repeat   testing, or testing by a different method, may be   indicated in some settings (e.g. patients with low   likelihood of HSV infection). If clinically   appropriate, retest 4-6 weeks later.    RPR 09/25/2023 Non-Reactive  Non-Reactive Final    Hepatitis C Quantitation 09/25/2023 HCV Not Detected  IU/mL Final    Test Information 09/25/2023 Comment   Final    The quantitative range of this assay is 15 IU/mL to 100 million IU/mL.       EKG Results:  No orders to display       Imaging Results:  No Images in the past 120 days found..      Assessment & Plan   Diagnoses and all orders for this visit:    1. Generalized anxiety disorder (Primary)  -     sertraline (Zoloft) 25 MG tablet; Take 2 tablets by mouth Daily.  Dispense: 180 tablet; Refill: 1    2. Major depressive disorder, recurrent episode, moderate  -     sertraline (Zoloft) 25 MG tablet; Take 2 tablets by mouth Daily.  Dispense: 180 tablet; Refill: 1    3. Insomnia due to mental condition  -     sertraline (Zoloft) 25 MG tablet; Take 2 tablets by mouth Daily.  Dispense: 180 tablet; Refill: 1    4. Attention and concentration deficit  -     Ambulatory Referral to Psychology        Visit Diagnoses:    ICD-10-CM ICD-9-CM   1. Generalized anxiety disorder  F41.1 300.02   2. Major depressive disorder, recurrent episode, moderate  F33.1 296.32   3. Insomnia due to mental condition  F51.05 300.9     327.02   4. Attention and concentration deficit  R41.840 799.51     03/13/2025: Stopped using light box. Urged to restart it. Hx of testing positive for ADHD, plus  compelling childhood hx, send to [facility] testing. Increase zoloft to 37.5 mg qday.    Acknowledged and normalized patient's thoughts, feelings, and concerns. Allowed patient to freely discuss and process issues, such as:  Anxiety regarding taking psychotropic medications.  ... using Rogerian psychotherapeutic techniques including unconditional positive regard, reflective listening, and demonstrating clear empathy, with the goal of ameliorating symptoms and maintaining, restoring, or improving self-esteem, adaptive skills, and ego or psychological functions (John et al. 1991), the long-term goal of which is to develop a better, healthier perspective and help the patient bear their circumstances more easily.  Time (minutes) spent providing supportive psychotherapy: 16  (This time is exclusive to the therapy session and separate from the time spent on activities used to meet the criteria for the E/M service (history, exam, medical decision-making).)  Start: 12:41  Stop: 12:57  Functional status: mild impairment  Treatment plan: Medication management and supportive psychotherapy  Prognosis: good  Progress: MDD  6w    01/08/2025: Improved, fairly stable, no changes, 2m.    11/25/2024: Start light box, zoloft for MDD, SAUD. Watch sexual side effects (occurred in the past). Wellbutrin helped in the past as well (for years), but then it stopped helping.    5/17/24: Stable, well, no changes.    2/13: Start hydroxyzine PRN for intermittent anger, per pts wishes. Referral to [facility] for therapy related to anger that occcurs a few times a year.    12/19: Drank for 9 years 87-14, 27 years. Treats insomnia with cannabis. Sober since 2014. Start abilify. Pt reports that compliance migh be an issue. 4w    PLAN:  Safety: No acute safety concerns  Therapy: Currently Seeing a Therapist  Risk Assessment: Risk of self-harm acutely is moderate.  Risk factors include anxiety disorder, mood disorder, some AODA, and recent psychosocial  stressors (pandemic). Protective factors include no family history, denies access to guns/weapons, no present SI, no history of suicide attempts or self-harm in the past, minimal AODA, healthcare seeking, future orientation, willingness to engage in care.  Risk of self-harm chronically is also moderate, but could be further elevated in the event of treatment noncompliance and/or AODA.  Meds:  CONTINUE light box sept thru mar  INCREASE zoloft 25 to 37.5 mg qday. Risks, benefits, alternatives discussed with patient including GI upset, nausea vomiting diarrhea, hyponatremia, theoretical decrease of seizure threshold predisposing the patient to a slightly higher seizure risk, headaches, sexual dysfunction, serotonin syndrome, bleeding risk, increased suicidality in patients 24 years and younger, switching to gaye/hypomania.  After discussion of these risks and benefits, the patient voiced understanding and agreed to proceed.  CONTINUE hydroxyzine 25 mg daily prn anxiety. Risks, benefits, alternatives discussed with patient including sedation, dizziness, fall risk, GI upset, and risk of increased CNS depression and elevated heart rate if taken with other antihistamines.  Use care when operating vehicle, vessel, or machine. After discussion of these risks and benefits, the patient voiced understanding and agreed to proceed.  S/P:  abilify 2 mg qhs. No help 5/24  Labs: none    Patient screened positive for depression based on a PHQ-9 score of 11 on 3/12/2025. Follow-up recommendations include: Prescribed antidepressant medication treatment and Suicide Risk Assessment performed.           TREATMENT PLAN/GOALS: Continue supportive psychotherapy efforts and medications as indicated. Treatment and medication options discussed during today's visit. Patient acknowledged and verbally consented to continue with current treatment plan and was educated on the importance of compliance with treatment and follow-up  appointments.    MEDICATION ISSUES:  LUIS ANGEL reviewed as expected.  Discussed medication options and treatment plan of prescribed medication as well as the risks, benefits, and side effects including potential falls, possible impaired driving and metabolic adversities among others. Patient is agreeable to call the office with any worsening of symptoms or onset of side effects. Patient is agreeable to call 911 or go to the nearest ER should he/she begin having SI/HI. No medication side effects or related complaints today.     MEDS ORDERED DURING VISIT:  New Medications Ordered This Visit   Medications    sertraline (Zoloft) 25 MG tablet     Sig: Take 2 tablets by mouth Daily.     Dispense:  180 tablet     Refill:  1     Replaces 25 mg dose. Thank you for the help. Please call with questions: [phone].       Return in about 6 weeks (around 4/24/2025) for Video visit.         This document has been electronically signed by Kely Sloan MD  March 13, 2025 12:59 EDT    Dictated Utilizing Dragon Dictation: Part of this note may be an electronic transcription/translation of spoken language to printed text using the Dragon Dictation System.

## 2025-04-11 ENCOUNTER — TELEMEDICINE (OUTPATIENT)
Dept: PSYCHIATRY | Facility: CLINIC | Age: 55
End: 2025-04-11
Payer: COMMERCIAL

## 2025-04-11 DIAGNOSIS — F33.1 MAJOR DEPRESSIVE DISORDER, RECURRENT EPISODE, MODERATE: ICD-10-CM

## 2025-04-11 DIAGNOSIS — F41.1 GENERALIZED ANXIETY DISORDER: Primary | ICD-10-CM

## 2025-04-11 NOTE — PROGRESS NOTES
Date: 2025  Time In: 08:05 EDT  Time out: 08:55 EDT      This provider is located at [facility], [facility] [place], [place], [facility], using a secure [employer] Video Visit through [employer]. Patient is being seen remotely via telehealth at their home address is located in [place]. Patient stated they are in a secure environment for this session. The patient's condition being diagnosed and treated is appropriate for telemedicine. The provider identified themself as well as their credentials. The patient, or  patient's legal guardian consent to be seen remotely, and when consent is given they understand that the consent allows for patient identifiable information to be sent to a third party as needed. They may refuse to be seen remotely at any time. The electronic data is encrypted and password protected, and the patient's or  legal guardian has been advised of the potential risks to privacy not withstanding such measures.   PT Identifiers used: Name and .    You have chosen to receive care through a telehealth visit.  Do you consent to use a video/audio connection for your medical care today? Yes     Subjective   Smooth Tse is a 54 y.o. male who presents today for follow up    Chief Complaint: Anxiety and Depression     History of Present Illness: Client discussed how things have been since last session.Client discussed that he feels that his medicine is currently helping him to not over think situations, but he finds that he can't really say that he also feels like wanting to do things or looking forward to doing things.  Client and therapist discussed some positive activities that he enjoys and client was able to identify that he use to enjoy going to concerts, watching sporting events.  Client reports that he does not really enjoy any of these things the way that he use to. Client and therapist discussed that he seems to quickly go to a place of negative thinking or  negative beliefs.  Client reports that the only things he really ever liked were alcohol, drugs and sex.  He admitted that these were things that helped him to escape, helped him to feel more up      Clinical Maneuvering/Intervention:    On a scale 0-10 ( with 10 being the worst)  Anxiety: 3/10 currently  Depression: 5/10 currently    Sleep:  No current changes    Appetite:  No current changes      Assisted patient in processing above session content; acknowledged and normalized patient’s thoughts, feelings, and concerns.  Rationalized patient thought process regarding concerns presented at session.  Discussed triggers associated with patient's  anxiety  and depression  Also discussed coping skills for patient to implement such as self care  and positive self talk     Allowed patient to freely discuss issues without interruption or judgment. Provided safe, confidential environment to facilitate the development of positive therapeutic relationship and encourage open, honest communication. Assisted patient in identifying risk factors which would indicate the need for higher level of care including thoughts to harm self or others and/or self-harming behavior and encouraged patient to contact this office, call 911, or present to the nearest emergency room should any of these events occur. Discussed crisis intervention services and means to access. Patient adamantly and convincingly denies current suicidal or homicidal ideation or perceptual disturbance.    Assessment:     Patient appears to maintain relative stability as compared to their baseline.  However, patient continues to struggle with anxiety and depression  which continues to cause impairment in important areas of functioning.  A result, they can be reasonably expected to continue to benefit from treatment and would likely be at increased risk for decompensation otherwise.           Mental Status Exam:   Hygiene:   good  Cooperation:  Cooperative  Eye Contact:   Good  Psychomotor Behavior:  Appropriate  Affect:  Appropriate  Mood: normal  Speech:  Normal  Thought Process:  Goal directed and Linear  Thought Content:  Mood congruent  Suicidal:  None  Homicidal:  None  Hallucinations:  None  Delusion:  None  Memory:  Intact  Orientation:  Person, Place, Time, and Situation  Reliability:  good  Insight:  Good  Judgement:  Good  Impulse Control:  Good  Physical/Medical Issues:   No current changes reported        Patient's Support Network Includes:  wife, children, and extended family    Functional Status: Moderate impairment     Progress toward goal: Not at goal    Prognosis: Fair with Ongoing Treatment         Plan:    Patient will continue in individual outpatient therapy with focus on improved functioning and coping skills, maintaining stability, and avoiding decompensation and the need for higher level of care.    Patient will adhere to medication regimen as prescribed and report any side effects. Patient will contact this office, call 911 or present to the nearest emergency room should suicidal or homicidal ideations occur. Provide Cognitive Behavioral Therapy and Solution Focused Therapy to improve functioning, maintain stability, and avoid decompensation and the need for higher level of care.     Return in about 2 weeks (around 4/25/2025).      VISIT DIAGNOSIS:    Diagnosis Plan   1. Generalized anxiety disorder        2. Major depressive disorder, recurrent episode, moderate               This document has been electronically signed by Louise Castellon LCSW  April 11, 2025      Part of this note may be an electronic transcription/translation of spoken language to printed text using the Dragon Dictation System.

## 2025-04-24 ENCOUNTER — TELEMEDICINE (OUTPATIENT)
Dept: PSYCHIATRY | Facility: CLINIC | Age: 55
End: 2025-04-24
Payer: COMMERCIAL

## 2025-04-24 ENCOUNTER — TELEPHONE (OUTPATIENT)
Dept: PSYCHIATRY | Facility: CLINIC | Age: 55
End: 2025-04-24

## 2025-04-24 DIAGNOSIS — F41.1 GENERALIZED ANXIETY DISORDER: Primary | ICD-10-CM

## 2025-04-24 DIAGNOSIS — F33.1 MAJOR DEPRESSIVE DISORDER, RECURRENT EPISODE, MODERATE: ICD-10-CM

## 2025-04-24 DIAGNOSIS — R41.840 ATTENTION AND CONCENTRATION DEFICIT: ICD-10-CM

## 2025-04-24 DIAGNOSIS — F51.05 INSOMNIA DUE TO MENTAL CONDITION: ICD-10-CM

## 2025-04-24 RX ORDER — FLUOXETINE 10 MG/1
10 CAPSULE ORAL DAILY
Qty: 30 CAPSULE | Refills: 2 | Status: SHIPPED | OUTPATIENT
Start: 2025-04-24

## 2025-04-24 NOTE — PROGRESS NOTES
"Subjective   Smooth Tse is a 54 y.o. male who presents today for initial evaluation     Referring Provider:  No referring provider defined for this encounter.    Chief Complaint:  SAUD    History of Present Illness:     23: Initial Chart review:     Kofi: blank  Care Everywhere: none    Psychotropic medication chart review:  Present:  none    Previously:  Ativan  Trintellix 10 mg qday  Lexapro didn't agree with me  Effexor: It was really bad but I can't remember    EKG: none  Procedures: none  Head imaging: none  Labs: gluc 118 on cmp, reassuring thyroid studies, cbc. High ldl.    Initial Chart notes: Seen  by PCP. Wanted a referral to psych.      Chart review:   2025: [facility] x2.  2025: [facility] x3, no acute on CXR.  2025: no visits, GAD7 8.  2024: no visits.  24: teletherapy x5.  : no visits. Pt didn't tolerate the abilify (took it for a few days).    Plannin2025: Stopped using light box. Urged to restart it. Hx of testing positive for ADHD, plus compelling childhood hx, send to [facility] for testing. Increase zoloft to 37.5 mg qday.  2025: Improved, fairly stable, no changes, 2m.  2024: Start light box, zoloft for MDD, SAUD. Watch sexual side effects (occurred in the past). Wellbutrin helped in the past as well (for years), but then it stopped helping.      Visits (Below):  \"Maynor\"  Denies Fhx of ADHD  States he was tested for ADHD in the past, tested high   2025:   Mode of Visit: Video  Location of patient: -HOME-  Location of provider: +[facility]+  You have chosen to receive care through a telehealth visit.  The patient has signed the video visit consent form.  The visit included audio and video interaction. No technical issues occurred during this visit.  Interview:  \"I'm at 37.5 mg.\"  50 mg I had overthinking  Then I went down to 25 mg. I'm fine.  Still no explosions  I didn't set up the testing for [facility] I need " "to  Adderall and vyvanse didn't help before. \"But I loved them... probably not for the right reasons.\"  Mood/Depression, seasonal: MDD fairly stable  Anxiety: SAUD mild irritability  Panic attacks: none  Energy: down  Concentration: chronically poor  Insomnia: stable  Eating: 169x4, 179 lbs  Refills: y  Substances: def  Therapy: was Dr. Romero, now [facility] ([facility])  Medication compliant: y  SE: n  No SI HI AVH.      03/13/2025:   Mode of Visit: Video  Location of patient: -HOME-  Location of provider: [facility]  You have chosen to receive care through a telehealth visit.  The patient has signed the video visit consent form.  The visit included audio and video interaction. No technical issues occurred during this visit.  Interview:  \"Pretty good.\"  Not using the light box  No explosions  But not motivated  No energy  Declines med changes  Some irritability  Tested positive for ADHD in the past  ADHD:   Elementary school:   Grades? perfect  Special classes or failures? Denies, gifted and talented  Got in trouble? Talking and getting out of his seat, class clown  Referral for ADHD testing? denies  FHx: denies  Presently:  I'm very organized  No forgetting, losing things (has a place for everything)  Problems with attention to detail, problems with sustained attention, problems listening when spoken to directly, failure to finish tasks, avoids tasks that require sustained mental effort, easily distracted, talks a lot and cutting people off, drifts off during conversation  Mood/Depression: MDD some mild anhedonia, seasonal  Anxiety: SAUD some irritability  Panic attacks: none  Energy: down  Concentration: chronically poor  Insomnia: stable  Eating: 169x3, 179 lbs  Refills: y  Substances: def  Therapy: was Dr. Romero, now [facility] ([facility], next week)  Medication compliant: y  SE: n  No SI HI AVH.      01/08/2025:   Mode of Visit: Video  Location of patient: -HOME-  Location of provider: +[facility]+  You have chosen to " "receive care through a telehealth visit.  The patient has signed the video visit consent form.  The visit included audio and video interaction. No technical issues occurred during this visit.  Interview:  \"Good.\"  I use the light box 4-5x a week  \"I don't like any medication, but... everything is working really well.\"  Taking between half a dose and a full 25 mg dose of zoloft.  \"I've been great. The last bad day was 12 days ago.\"  Less irritable  Mood/Depression: MDD depressed mood, seasonal -- improving  Anxiety: SAUD more irritable, on edge, excessive worrying -- improving  Panic attacks: none  Energy: down  Concentration: stable  Insomnia: stable  Eating: 169x3, 179 lbs  Refills: y  Substances: def  Therapy: was teri Cruz ([facility], next week)  Medication compliant: y  SE: n  No SI HI AVH.      11/25/2024:   Mode of Visit: Video  Location of patient: -HOME-  Location of provider: +[facility]+  You have chosen to receive care through a telehealth visit.  The patient has signed the video visit consent form.  The visit included audio and video interaction. No technical issues occurred during this visit.  Interview:  \"We've been working through some things.\"  I still get easily irritable  I tolerated zoloft in the past  I'm very sensitive to medications  Using light box  This month has been worse  Mostly work stresses  GAD7 8.  Mood/Depression: MDD depressed mood, seasonal  Anxiety: SAUD more irritable, on edge, excessive worrying  Panic attacks: n  Energy: down  Concentration: stable  Insomnia: stable  Eating: 169x2, 179 lbs  Refills: y  Substances: def  Therapy: was teri Cruz ([place], likes  Medication compliant: y  SE: n  No SI HI AVH.    ...      12/19: In person.  Interview:  Chart review:   His/Her Story: \"My main gist of being here is 10 min every 3 or 4 months.\"  P5, G11  That's when bad Maynor comes out  Most of the time I'm pretty laid back but things can slowly build over several " "months, then sometimes I can yell at the top of my lungs, or throw things.  I've hit a wall before.  Then it's over and I'm fine... but it builds again.  Last episode was September.  I also have \"depressive episodes\".  Going on my whole life. 11 yo that's when I felt \"this darkness come over me.\"  I saw my Mom armen during her episodes (maybe 15 min). Usually happy.  Her brother recently . He was known to have his sad times.  Growing up it was kindof cold, even though it was loving. You couldn't be really expressive.  I was living in a fantasy world to escape to a more exciting world  I used to lie, forge signatures for school  I drank at 16 yo, \"I needed alcohol.\" It turned me into this outgoing and fun charu.  Then drugs. I loved cocaine.  Depression/Mood: A little depressed   Depressed mood  Seasonal pattern: yes  Severity: Moderate  Duration: On and off for years since 11 yo  Anxiety:  Uncontrolled worrying, muscle tension, fatigue,  feeling on edge or restless, irritability, insomnia.  Severity: Moderate  Sense of doom  Duration: On and off for years since 11 yo  Panic attacks:  Psych ROS: Positive for depression, anxiety.  Negative for psychosis but positive for hypomania.  ADHD: def  PTSD: def  No SI HI AVH.  Medication compliant: na  Has a therapist for 10 years. Nick.    Access to Firearms: denies    PHQ-9 Depression Screening  PHQ-9 Total Score:      Little interest or pleasure in doing things?     Feeling down, depressed, or hopeless?     Trouble falling or staying asleep, or sleeping too much?     Feeling tired or having little energy?     Poor appetite or overeating?     Feeling bad about yourself - or that you are a failure or have let yourself or your family down?     Trouble concentrating on things, such as reading the newspaper or watching television?     Moving or speaking so slowly that other people could have noticed? Or the opposite - being so fidgety or restless that you have been moving around " a lot more than usual?     Thoughts that you would be better off dead, or of hurting yourself in some way?     PHQ-9 Total Score       SAUD-7       Past Surgical History:  Past Surgical History:   Procedure Laterality Date    VASECTOMY         Problem List:  Patient Active Problem List   Diagnosis    Anxiety disorder    Stress    Lipid screening       Allergy:   Allergies   Allergen Reactions    Ciprofloxacin Mental Status Change        Discontinued Medications:  Medications Discontinued During This Encounter   Medication Reason    sertraline (Zoloft) 25 MG tablet Side effects             Current Medications:   Current Outpatient Medications   Medication Sig Dispense Refill    FLUoxetine (PROzac) 10 MG capsule Take 1 capsule by mouth Daily. 30 capsule 2     No current facility-administered medications for this visit.       Past Medical History:  Past Medical History:   Diagnosis Date    Anxiety     Depression        Past Psychiatric History:  Began Treatment: 15 years ago  Diagnoses: depression  Psychiatrist: multiple  Therapist: once a month  Admission History:Denies  Medication Trials:    Many    Pristiq, others    Wellbutrin: I got so spaced out.    Never put on a mood stabilizer    Self Harm: Denies  Suicide Attempts:Denies      Substance Abuse History:   Types: cannabis  Withdrawal Symptoms:Denies  Longest Period Sober:Not Applicable   AA: Not applicable     Social History:  Martial Status:  Employed:Yes  Kids:Yes  House:Lives in a house   History: Denies    Social History     Socioeconomic History    Marital status:    Tobacco Use    Smoking status: Never    Smokeless tobacco: Never   Vaping Use    Vaping status: Former   Substance and Sexual Activity    Alcohol use: Not Currently    Drug use: Yes     Types: Marijuana    Sexual activity: Yes     Partners: Female     Birth control/protection: Condom, Vasectomy       Family History: (1st degree)  Suicide Attempts: Denies  Suicide  "Completions:Denies      Family History   Problem Relation Age of Onset    Depression Mother     Cancer Mother     Anxiety disorder Mother     Depression Father     Cancer Father     Anxiety disorder Father        Developmental History:       Childhood: Denies Abuse, but my parents were older, Mom had me when she was 40, dad was 46  High School:Completed  College: 5 yr, no degree    Mental Status Exam  Appearance  : groomed, good eye contact, normal street clothes  Behavior  : pleasant and cooperative  Motor  : No abnormal  Speech  : talkative, interrupting, normal rhythm, rate, volume, tone, not hyperverbal, not pressured, normal prosidy  Mood  : \"still tired\"  Affect  : euthymic, mood incongruent, good variability  Thought Content  : negative suicidal ideations, negative homicidal ideations, negative obsessions  Perceptions  : negative auditory hallucinations, negative visual hallucinations  Thought Process  : linear  Insight/Judgement  : Fair/fair  Cognition  : grossly intact  Attention   : intact      Review of Systems:  Review of Systems   Constitutional:  Negative for diaphoresis and fatigue.   HENT:  Negative for drooling.    Eyes:  Negative for visual disturbance.   Respiratory:  Negative for cough and shortness of breath.    Cardiovascular:  Negative for chest pain, palpitations and leg swelling.   Gastrointestinal:  Negative for nausea and vomiting.   Endocrine: Negative for cold intolerance and heat intolerance.   Genitourinary:  Negative for difficulty urinating.   Musculoskeletal:  Negative for joint swelling.   Allergic/Immunologic: Negative for immunocompromised state.   Neurological:  Negative for dizziness, seizures, speech difficulty and numbness.   Psychiatric/Behavioral:  Negative for confusion.        Physical Exam:  Physical Exam    Vital Signs:   There were no vitals taken for this visit.     Lab Results:   No visits with results within 6 Month(s) from this visit.   Latest known visit with " results is:   Office Visit on 09/25/2023   Component Date Value Ref Range Status    Chlamydia trachomatis, RANDALL 09/25/2023 Negative  Negative Final    Gonococcus by RANDALL 09/25/2023 Negative  Negative Final    Trichomonas vaginosis 09/25/2023 Negative  Negative Final    Hepatitis B Surface Ag 09/25/2023 Non-Reactive  Non-Reactive Final    Hep A IgM 09/25/2023 Non-Reactive  Non-Reactive Final    Hep B C IgM 09/25/2023 Non-Reactive  Non-Reactive Final    Hepatitis C Ab 09/25/2023 Non-Reactive  Non-Reactive Final    HIV DUO 09/25/2023 Non-Reactive  Non-Reactive Final    HSV 1 IgG, Type Specific 09/25/2023 24.60 (H)  0.00 - 0.90 index Final                                     Negative        <0.91                                   Equivocal 0.91 - 1.09                                   Positive        >1.09   Note: Negative indicates no antibodies detected to   HSV-1. Equivocal may suggest early infection.  If   clinically appropriate, retest at later date. Positive   indicates antibodies detected to HSV-1.    HSV 2 IgG, Type Spec 09/25/2023 <0.91  0.00 - 0.90 index Final                                     Negative        <0.91                                   Equivocal 0.91 - 1.09                                   Positive        >1.09   HSV-2 Antibody Interpretation: Negative indicates no   detectable antibodies to HSV-2 were found. If recent   exposure is suspected, retest in 4-6 weeks. Equivocal   samples should be retested in 4-6 weeks. Positive   indicates the presence of detectable IgG antibody to   HSV-2. False positive results may occur. Repeat   testing, or testing by a different method, may be   indicated in some settings (e.g. patients with low   likelihood of HSV infection). If clinically   appropriate, retest 4-6 weeks later.    RPR 09/25/2023 Non-Reactive  Non-Reactive Final    Hepatitis C Quantitation 09/25/2023 HCV Not Detected  IU/mL Final    Test Information 09/25/2023 Comment   Final    The  quantitative range of this assay is 15 IU/mL to 100 million IU/mL.       EKG Results:  No orders to display       Imaging Results:  No Images in the past 120 days found..      Assessment & Plan   Diagnoses and all orders for this visit:    1. Generalized anxiety disorder (Primary)  -     FLUoxetine (PROzac) 10 MG capsule; Take 1 capsule by mouth Daily.  Dispense: 30 capsule; Refill: 2    2. Major depressive disorder, recurrent episode, moderate  -     FLUoxetine (PROzac) 10 MG capsule; Take 1 capsule by mouth Daily.  Dispense: 30 capsule; Refill: 2    3. Insomnia due to mental condition  -     FLUoxetine (PROzac) 10 MG capsule; Take 1 capsule by mouth Daily.  Dispense: 30 capsule; Refill: 2    4. Attention and concentration deficit  -     FLUoxetine (PROzac) 10 MG capsule; Take 1 capsule by mouth Daily.  Dispense: 30 capsule; Refill: 2        Visit Diagnoses:    ICD-10-CM ICD-9-CM   1. Generalized anxiety disorder  F41.1 300.02   2. Major depressive disorder, recurrent episode, moderate  F33.1 296.32   3. Insomnia due to mental condition  F51.05 300.9     327.02   4. Attention and concentration deficit  R41.840 799.51     04/24/2025: Stop zoloft (affective blunting), start prozac. Adderall led to less inhibitions.    Acknowledged and normalized patient's thoughts, feelings, and concerns. Allowed patient to freely discuss and process issues, such as:  Anxiety regarding taking psychotropic medications.  ... using Rogerian psychotherapeutic techniques including unconditional positive regard, reflective listening, and demonstrating clear empathy, with the goal of ameliorating symptoms and maintaining, restoring, or improving self-esteem, adaptive skills, and ego or psychological functions (John et al. 1991), the long-term goal of which is to develop a better, healthier perspective and help the patient bear their circumstances more easily.  Time (minutes) spent providing supportive psychotherapy: 16  (This time is  exclusive to the therapy session and separate from the time spent on activities used to meet the criteria for the E/M service (history, exam, medical decision-making).)  Start: 12:44  Stop: 01:00  Functional status: mild impairment  Treatment plan: Medication management and supportive psychotherapy  Prognosis: good  Progress: MDD  5w    03/13/2025: Stopped using light box. Urged to restart it. Hx of testing positive for ADHD, plus compelling childhood hx, send to [facility] for testing. Increase zoloft to 37.5 mg qday.    01/08/2025: Improved, fairly stable, no changes, 2m.    11/25/2024: Start light box, zoloft for MDD, SAUD. Watch sexual side effects (occurred in the past). Wellbutrin helped in the past as well (for years), but then it stopped helping.    5/17/24: Stable, well, no changes.    2/13: Start hydroxyzine PRN for intermittent anger, per pts wishes. Referral to [facility] for therapy related to anger that occcurs a few times a year.    12/19: Drank for 9 years 87-14, 27 years. Treats insomnia with cannabis. Sober since 2014. Start abilify. Pt reports that compliance migh be an issue. 4w    PLAN:  Safety: No acute safety concerns  Therapy: Currently Seeing a Therapist  Risk Assessment: Risk of self-harm acutely is moderate.  Risk factors include anxiety disorder, mood disorder, some AODA, and recent psychosocial stressors (pandemic). Protective factors include no family history, denies access to guns/weapons, no present SI, no history of suicide attempts or self-harm in the past, minimal AODA, healthcare seeking, future orientation, willingness to engage in care.  Risk of self-harm chronically is also moderate, but could be further elevated in the event of treatment noncompliance and/or AODA.  Meds:  CONTINUE light box sept thru mar  STOP zoloft 37.5 mg qday.   START prozac 10 mg qday. Risks, benefits, alternatives discussed with patient including GI upset, nausea vomiting diarrhea, hyponatremia, theoretical  decrease of seizure threshold predisposing the patient to a slightly higher seizure risk, headaches, sexual dysfunction, serotonin syndrome, bleeding risk, increased suicidality in patients 24 years and younger, switching to gaye/hypomania.  After discussion of these risks and benefits, the patient voiced understanding and agreed to proceed.  CONTINUE hydroxyzine 25 mg daily prn anxiety. Risks, benefits, alternatives discussed with patient including sedation, dizziness, fall risk, GI upset, and risk of increased CNS depression and elevated heart rate if taken with other antihistamines.  Use care when operating vehicle, vessel, or machine. After discussion of these risks and benefits, the patient voiced understanding and agreed to proceed.  S/P:  abilify 2 mg qhs. No help 5/24  Labs: none    Patient screened positive for depression based on a PHQ-9 score of 11 on 3/12/2025. Follow-up recommendations include: Prescribed antidepressant medication treatment and Suicide Risk Assessment performed.           TREATMENT PLAN/GOALS: Continue supportive psychotherapy efforts and medications as indicated. Treatment and medication options discussed during today's visit. Patient acknowledged and verbally consented to continue with current treatment plan and was educated on the importance of compliance with treatment and follow-up appointments.    MEDICATION ISSUES:  LUIS ANGEL reviewed as expected.  Discussed medication options and treatment plan of prescribed medication as well as the risks, benefits, and side effects including potential falls, possible impaired driving and metabolic adversities among others. Patient is agreeable to call the office with any worsening of symptoms or onset of side effects. Patient is agreeable to call 911 or go to the nearest ER should he/she begin having SI/HI. No medication side effects or related complaints today.     MEDS ORDERED DURING VISIT:  New Medications Ordered This Visit   Medications     FLUoxetine (PROzac) 10 MG capsule     Sig: Take 1 capsule by mouth Daily.     Dispense:  30 capsule     Refill:  2     Please dc zoloft. Thank you for the help. Please call with questions: [phone].       Return in about 5 weeks (around 5/29/2025) for Video visit.         This document has been electronically signed by Kely Sloan MD  April 24, 2025 13:00 EDT    Dictated Utilizing Dragon Dictation: Part of this note may be an electronic transcription/translation of spoken language to printed text using the Dragon Dictation System.

## 2025-05-02 ENCOUNTER — TELEPHONE (OUTPATIENT)
Dept: PSYCHIATRY | Facility: CLINIC | Age: 55
End: 2025-05-02

## 2025-05-07 ENCOUNTER — TELEPHONE (OUTPATIENT)
Dept: PSYCHIATRY | Facility: CLINIC | Age: 55
End: 2025-05-07
Payer: COMMERCIAL

## 2025-05-07 DIAGNOSIS — F33.1 MAJOR DEPRESSIVE DISORDER, RECURRENT EPISODE, MODERATE: ICD-10-CM

## 2025-05-07 DIAGNOSIS — R41.840 ATTENTION AND CONCENTRATION DEFICIT: ICD-10-CM

## 2025-05-07 DIAGNOSIS — F41.1 GENERALIZED ANXIETY DISORDER: ICD-10-CM

## 2025-05-07 DIAGNOSIS — F51.05 INSOMNIA DUE TO MENTAL CONDITION: ICD-10-CM

## 2025-05-07 NOTE — TELEPHONE ENCOUNTER
PT(PATIENT) LEFT A VOICEMAIL REQUESTING A CALL BACK TO DISCUSS MEDICATIONS     PT(PATIENT) DID NOT LEAVE ANY ADDITIONAL DETAILS

## 2025-05-07 NOTE — TELEPHONE ENCOUNTER
CALLED PT BACK TO DISCUSS MEDS PT STATES HE INCREASE PROZAC TO 20 MG INSTEAD ON 10MG FOR THE PAST 3 DAYS STATES MEDICATION IS WORKING WELL PT WOULD LIKE TO KNOW IF THIS IS OK AND IF SO PT NEEDS REFILL FOR 20 MG DOSE PLEASE ADVISE

## 2025-05-08 NOTE — TELEPHONE ENCOUNTER
ATTEMPTED TO CONTACT PT(PATIENT)      NO ANSWER      LEFT VOICEMAIL WITH INSTRUCTIONS TO RETURN CALL TO OFFICE AT [phone]

## 2025-05-21 ENCOUNTER — DOCUMENTATION (OUTPATIENT)
Dept: PSYCHIATRY | Facility: CLINIC | Age: 55
End: 2025-05-21
Payer: COMMERCIAL

## 2025-05-29 NOTE — PROGRESS NOTES
"Subjective   Smooth Tse is a 54 y.o. male who presents today for follow up    Referring Provider:  No referring provider defined for this encounter.    Chief Complaint:  SAUD    History of Present Illness:     Chart review:   2025: no visits, Pt has ADHD per Rishi.  2025: Castellno x2.  2025: Castellon x3, no acute on CXR.  2025: no visits, GAD7 8.  2024: no visits.  24: teletherapy x5.  : no visits. Pt didn't tolerate the abilify (took it for a few days).    Visits (Below):  \"Maynor\"  Denies Fhx of ADHD  States he was tested for ADHD in the past, tested high   2025:   Mode of Visit: Video  Location of patient: -HOME-  Location of provider[place] [facility]  You have chosen to receive care through a telehealth visit.  The patient has signed the video visit consent form.  The visit included audio and video interaction. No technical issues occurred during this visit.  Interview:  \"I love the prozac.\"  We discussed how he has ADHD  Mood/Depression, seasonal: MDD fairly stable  ADHD: not at goal  Anxiety: SAUD improved, stable  Panic attacks: none  Energy: down  Concentration: chronically poor  Insomnia: stable  Eating: 169x5, 179 lbs  Refills: y  Substances: OTC THC products  Therapy: was Dr. Romero, now Ayden (Ravinder)  Medication compliant: y  SE: n  No SI HI AVH.      2025:   Mode of Visit: Video  Location of patient: -HOME-  Location of provider: +[facility]+  You have chosen to receive care through a telehealth visit.  The patient has signed the video visit consent form.  The visit included audio and video interaction. No technical issues occurred during this visit.  Interview:  \"I'm at 37.5 mg.\"  50 mg I had overthinking  Then I went down to 25 mg. I'm fine.  Still no explosions  I didn't set up the testing for Rishi, I need to  Adderall and vyvanse didn't help before. \"But I loved them... probably not for the right reasons.\"  Mood/Depression, " "seasonal: MDD fairly stable  Anxiety: SAUD mild irritability  Panic attacks: none  Energy: down  Concentration: chronically poor  Insomnia: stable  Eating: 169x4, 179 lbs  Refills: y  Substances: def  Therapy: was teri Cruz (Ravinder)  Medication compliant: y  SE: n  No SI HI AV.      03/13/2025:   Mode of Visit: Video  Location of patient: -HOME-  Location of provider: [facility]  You have chosen to receive care through a telehealth visit.  The patient has signed the video visit consent form.  The visit included audio and video interaction. No technical issues occurred during this visit.  Interview:  \"Pretty good.\"  Not using the light box  No explosions  But not motivated  No energy  Declines med changes  Some irritability  Tested positive for ADHD in the past  ADHD:   Elementary school:   Grades? perfect  Special classes or failures? Denies, gifted and talented  Got in trouble? Talking and getting out of his seat, class clown  Referral for ADHD testing? denies  FHx: denies  Presently:  I'm very organized  No forgetting, losing things (has a place for everything)  Problems with attention to detail, problems with sustained attention, problems listening when spoken to directly, failure to finish tasks, avoids tasks that require sustained mental effort, easily distracted, talks a lot and cutting people off, drifts off during conversation  Mood/Depression: MDD some mild anhedonia, seasonal  Anxiety: SAUD some irritability  Panic attacks: none  Energy: down  Concentration: chronically poor  Insomnia: stable  Eating: 169x3, 179 lbs  Refills: y  Substances: def  Therapy: was teri Cruz (Ravinder, next week)  Medication compliant: y  SE: n  No SI HI AV.      01/08/2025:   Mode of Visit: Video  Location of patient: -HOME-  Location of provider: +[facility]+  You have chosen to receive care through a telehealth visit.  The patient has signed the video visit consent form.  The visit included audio and " "video interaction. No technical issues occurred during this visit.  Interview:  \"Good.\"  I use the light box 4-5x a week  \"I don't like any medication, but... everything is working really well.\"  Taking between half a dose and a full 25 mg dose of zoloft.  \"I've been great. The last bad day was 12 days ago.\"  Less irritable  Mood/Depression: MDD depressed mood, seasonal -- improving  Anxiety: SAUD more irritable, on edge, excessive worrying -- improving  Panic attacks: none  Energy: down  Concentration: stable  Insomnia: stable  Eating: 169x3, 179 lbs  Refills: y  Substances: def  Therapy: was teri Cruz (Ravinder, next week)  Medication compliant: y  SE: n  No SI HI AVH.      11/25/2024:   Mode of Visit: Video  Location of patient: -HOME-  Location of provider: +[facility]  You have chosen to receive care through a telehealth visit.  The patient has signed the video visit consent form.  The visit included audio and video interaction. No technical issues occurred during this visit.  Interview:  \"We've been working through some things.\"  I still get easily irritable  I tolerated zoloft in the past  I'm very sensitive to medications  Using light box  This month has been worse  Mostly work stresses  GAD7 8.  Mood/Depression: MDD depressed mood, seasonal  Anxiety: SAUD more irritable, on edge, excessive worrying  Panic attacks: n  Energy: down  Concentration: stable  Insomnia: stable  Eating: 169x2, 179 lbs  Refills: y  Substances: def  Therapy: was Dr. Romero now Ayden (Ravinder), likes  Medication compliant: y  SE: n  No SI HI AVH.    ...    12/19/23: Initial Chart review:     Kofi: blank  Care Everywhere: none    Psychotropic medication chart review:  Present:  none    Previously:  Ativan  Trintellix 10 mg qday  Lexapro didn't agree with me  Effexor: It was really bad but I can't remember    EKG: none  Procedures: none  Head imaging: none  Labs: gluc 118 on cmp, reassuring thyroid studies, cbc. High ldl.  " "  Initial Chart notes: Seen  by PCP. Wanted a referral to psych.      : In person.  Interview:  Chart review:   His/Her Story: \"My main gist of being here is 10 min every 3 or 4 months.\"  P5, G11  That's when bad Maynor comes out  Most of the time I'm pretty laid back but things can slowly build over several months, then sometimes I can yell at the top of my lungs, or throw things.  I've hit a wall before.  Then it's over and I'm fine... but it builds again.  Last episode was September.  I also have \"depressive episodes\".  Going on my whole life. 11 yo that's when I felt \"this darkness come over me.\"  I saw my Mom weep during her episodes (maybe 15 min). Usually happy.  Her brother recently . He was known to have his sad times.  Growing up it was kindof cold, even though it was loving. You couldn't be really expressive.  I was living in a fantasy world to escape to a more exciting world  I used to lie, forge signatures for school  I drank at 16 yo, \"I needed alcohol.\" It turned me into this outgoing and fun charu.  Then drugs. I loved cocaine.  Depression/Mood: A little depressed   Depressed mood  Seasonal pattern: yes  Severity: Moderate  Duration: On and off for years since 11 yo  Anxiety:  Uncontrolled worrying, muscle tension, fatigue,  feeling on edge or restless, irritability, insomnia.  Severity: Moderate  Sense of doom  Duration: On and off for years since 11 yo  Panic attacks:  Psych ROS: Positive for depression, anxiety.  Negative for psychosis but positive for hypomania.  ADHD: def  PTSD: def  No SI HI AVH.  Medication compliant: na  Has a therapist for 10 years. Nick.    Access to Firearms: denies    PHQ-9 Depression Screening  PHQ-9 Total Score:      Little interest or pleasure in doing things?     Feeling down, depressed, or hopeless?     Trouble falling or staying asleep, or sleeping too much?     Feeling tired or having little energy?     Poor appetite or overeating?     Feeling bad about " yourself - or that you are a failure or have let yourself or your family down?     Trouble concentrating on things, such as reading the newspaper or watching television?     Moving or speaking so slowly that other people could have noticed? Or the opposite - being so fidgety or restless that you have been moving around a lot more than usual?     Thoughts that you would be better off dead, or of hurting yourself in some way?     PHQ-9 Total Score       SAUD-7       Past Surgical History:  Past Surgical History:   Procedure Laterality Date    VASECTOMY         Problem List:  Patient Active Problem List   Diagnosis    Anxiety disorder    Stress    Lipid screening       Allergy:   Allergies   Allergen Reactions    Ciprofloxacin Mental Status Change        Discontinued Medications:  There are no discontinued medications.            Current Medications:   Current Outpatient Medications   Medication Sig Dispense Refill    FLUoxetine (PROzac) 20 MG capsule Take 1 capsule by mouth Daily. 90 capsule 1     No current facility-administered medications for this visit.       Past Medical History:  Past Medical History:   Diagnosis Date    Anxiety     Depression        Past Psychiatric History:  Began Treatment: 15 years ago  Diagnoses: depression  Psychiatrist: multiple  Therapist: once a month  Admission History:Denies  Medication Trials:    Many    Pristiq, others    Wellbutrin: I got so spaced out.    Never put on a mood stabilizer    Self Harm: Denies  Suicide Attempts:Denies      Substance Abuse History:   Types: cannabis  Withdrawal Symptoms:Denies  Longest Period Sober:Not Applicable   AA: Not applicable     Social History:  Martial Status:  Employed:Yes  Kids:Yes  House:Lives in a house   History: Denies    Social History     Socioeconomic History    Marital status:    Tobacco Use    Smoking status: Never    Smokeless tobacco: Never   Vaping Use    Vaping status: Former   Substance and Sexual Activity  "   Alcohol use: Not Currently    Drug use: Yes     Types: Marijuana    Sexual activity: Yes     Partners: Female     Birth control/protection: Condom, Vasectomy       Family History: (1st degree)  Suicide Attempts: Denies  Suicide Completions:Denies      Family History   Problem Relation Age of Onset    Depression Mother     Cancer Mother     Anxiety disorder Mother     Depression Father     Cancer Father     Anxiety disorder Father        Developmental History:       Childhood: Denies Abuse, but my parents were older, Mom had me when she was 40, dad was 46  High School:Completed  College: 5 yr, no degree    Mental Status Exam  Appearance  : groomed, good eye contact, normal street clothes  Behavior  : pleasant and cooperative  Motor  : No abnormal  Speech  : talkative, interrupting, normal rhythm, rate, volume, tone, not hyperverbal, not pressured, normal prosidy  Mood  : \"I'm grateful, thank you\"  Affect  : euthymic, mood congruent, good variability  Thought Content  : negative suicidal ideations, negative homicidal ideations, negative obsessions  Perceptions  : negative auditory hallucinations, negative visual hallucinations  Thought Process  : linear  Insight/Judgement  : Fair/fair  Cognition  : grossly intact  Attention   : intact      Review of Systems:  Review of Systems   Constitutional:  Negative for diaphoresis and fatigue.   HENT:  Negative for drooling.    Eyes:  Negative for visual disturbance.   Respiratory:  Negative for cough and shortness of breath.    Cardiovascular:  Negative for chest pain, palpitations and leg swelling.   Gastrointestinal:  Negative for nausea and vomiting.   Endocrine: Negative for cold intolerance and heat intolerance.   Genitourinary:  Negative for difficulty urinating.   Musculoskeletal:  Negative for joint swelling.   Allergic/Immunologic: Negative for immunocompromised state.   Neurological:  Negative for dizziness, seizures, speech difficulty and numbness. "   Psychiatric/Behavioral:  Negative for confusion.        Physical Exam:  Physical Exam    Vital Signs:   There were no vitals taken for this visit.     Lab Results:   No visits with results within 6 Month(s) from this visit.   Latest known visit with results is:   Office Visit on 09/25/2023   Component Date Value Ref Range Status    Chlamydia trachomatis, RANDALL 09/25/2023 Negative  Negative Final    Gonococcus by RANDALL 09/25/2023 Negative  Negative Final    Trichomonas vaginosis 09/25/2023 Negative  Negative Final    Hepatitis B Surface Ag 09/25/2023 Non-Reactive  Non-Reactive Final    Hep A IgM 09/25/2023 Non-Reactive  Non-Reactive Final    Hep B C IgM 09/25/2023 Non-Reactive  Non-Reactive Final    Hepatitis C Ab 09/25/2023 Non-Reactive  Non-Reactive Final    HIV DUO 09/25/2023 Non-Reactive  Non-Reactive Final    HSV 1 IgG, Type Specific 09/25/2023 24.60 (H)  0.00 - 0.90 index Final                                     Negative        <0.91                                   Equivocal 0.91 - 1.09                                   Positive        >1.09   Note: Negative indicates no antibodies detected to   HSV-1. Equivocal may suggest early infection.  If   clinically appropriate, retest at later date. Positive   indicates antibodies detected to HSV-1.    HSV 2 IgG, Type Spec 09/25/2023 <0.91  0.00 - 0.90 index Final                                     Negative        <0.91                                   Equivocal 0.91 - 1.09                                   Positive        >1.09   HSV-2 Antibody Interpretation: Negative indicates no   detectable antibodies to HSV-2 were found. If recent   exposure is suspected, retest in 4-6 weeks. Equivocal   samples should be retested in 4-6 weeks. Positive   indicates the presence of detectable IgG antibody to   HSV-2. False positive results may occur. Repeat   testing, or testing by a different method, may be   indicated in some settings (e.g. patients with low   likelihood of  HSV infection). If clinically   appropriate, retest 4-6 weeks later.    RPR 09/25/2023 Non-Reactive  Non-Reactive Final    Hepatitis C Quantitation 09/25/2023 HCV Not Detected  IU/mL Final    Test Information 09/25/2023 Comment   Final    The quantitative range of this assay is 15 IU/mL to 100 million IU/mL.       EKG Results:  No orders to display       Imaging Results:  No Images in the past 120 days found..      Assessment & Plan   Diagnoses and all orders for this visit:    1. ADHD (attention deficit hyperactivity disorder), inattentive type (Primary)  -     Urine Drug Screen - Urine, Clean Catch; Future    2. Generalized anxiety disorder    3. Major depressive disorder, recurrent episode, moderate    4. Insomnia due to mental condition    5. Attention and concentration deficit        Visit Diagnoses:    ICD-10-CM ICD-9-CM   1. ADHD (attention deficit hyperactivity disorder), inattentive type  F90.0 314.00   2. Generalized anxiety disorder  F41.1 300.02   3. Major depressive disorder, recurrent episode, moderate  F33.1 296.32   4. Insomnia due to mental condition  F51.05 300.9     327.02   5. Attention and concentration deficit  R41.840 799.51     05/30/2025: Start focalin XR 10 mg for ADHD. once obtain UDS, CSA.    Acknowledged and normalized patient's thoughts, feelings, and concerns. Allowed patient to freely discuss and process issues, such as:  Anxiety regarding taking psychotropic medications.  ... using Rogerian psychotherapeutic techniques including unconditional positive regard, reflective listening, and demonstrating clear empathy, with the goal of ameliorating symptoms and maintaining, restoring, or improving self-esteem, adaptive skills, and ego or psychological functions (John et al. 1991), the long-term goal of which is to develop a better, healthier perspective and help the patient bear their circumstances more easily.  Time (minutes) spent providing supportive psychotherapy: 10  (This time is  exclusive to the therapy session and separate from the time spent on activities used to meet the criteria for the E/M service (history, exam, medical decision-making).)  Start: 12:40  Stop: 12:50  Functional status: mild impairment  Treatment plan: Medication management and supportive psychotherapy  Prognosis: good  Progress: ADHD  6w    04/24/2025: Stop zoloft (affective blunting), start prozac. Adderall led to less inhibitions.    03/13/2025: Stopped using light box. Urged to restart it. Hx of testing positive for ADHD, plus compelling childhood hx, send to [facility] testing. Increase zoloft to 37.5 mg qday.    01/08/2025: Improved, fairly stable, no changes, 2m.    11/25/2024: Start light box, zoloft for MDD, SAUD. Watch sexual side effects (occurred in the past). Wellbutrin helped in the past as well (for years), but then it stopped helping.    5/17/24: Stable, well, no changes.    2/13: Start hydroxyzine PRN for intermittent anger, per pts wishes. Referral to Ayden for therapy related to anger that occcurs a few times a year.    12/19: Drank for 9 years 87-14, 27 years. Treats insomnia with cannabis. Sober since 2014. Start abilify. Pt reports that compliance migh be an issue. 4w    PLAN:  Safety: No acute safety concerns  Therapy: Currently Seeing a Therapist  Risk Assessment: Risk of self-harm acutely is moderate.  Risk factors include anxiety disorder, mood disorder, some AODA, and recent psychosocial stressors (pandemic). Protective factors include no family history, denies access to guns/weapons, no present SI, no history of suicide attempts or self-harm in the past, minimal AODA, healthcare seeking, future orientation, willingness to engage in care.  Risk of self-harm chronically is also moderate, but could be further elevated in the event of treatment noncompliance and/or AODA.  Meds:  CONTINUE light box sept thru mar  START focalin XR 10 mg qday. Risks, benefits, side effects discussed with patient  including elevated heart rate, elevated blood pressure, irritability, insomnia, sexual dysfunction, appetite suppressing properties, psychosis, stroke, myocardial infarction, seizure.  After discussion of these risks and benefits, the patient voiced understanding and agreed to proceed. Luis Angel reviewed, UDS ordered, and controlled substance agreement signed.  CONTINUE prozac 20 mg qday. Risks, benefits, alternatives discussed with patient including GI upset, nausea vomiting diarrhea, hyponatremia, theoretical decrease of seizure threshold predisposing the patient to a slightly higher seizure risk, headaches, sexual dysfunction, serotonin syndrome, bleeding risk, increased suicidality in patients 24 years and younger, switching to gaye/hypomania.  After discussion of these risks and benefits, the patient voiced understanding and agreed to proceed.  CONTINUE hydroxyzine 25 mg daily prn anxiety. Risks, benefits, alternatives discussed with patient including sedation, dizziness, fall risk, GI upset, and risk of increased CNS depression and elevated heart rate if taken with other antihistamines.  Use care when operating vehicle, vessel, or machine. After discussion of these risks and benefits, the patient voiced understanding and agreed to proceed.  S/P:  zoloft 37.5 mg qday.   abilify 2 mg qhs. No help 5/24  Labs: none    Patient screened positive for depression based on a PHQ-9 score of 11 on 3/12/2025. Follow-up recommendations include: Prescribed antidepressant medication treatment and Suicide Risk Assessment performed.           TREATMENT PLAN/GOALS: Continue supportive psychotherapy efforts and medications as indicated. Treatment and medication options discussed during today's visit. Patient acknowledged and verbally consented to continue with current treatment plan and was educated on the importance of compliance with treatment and follow-up appointments.    MEDICATION ISSUES:  LUIS ANGEL reviewed as  expected.  Discussed medication options and treatment plan of prescribed medication as well as the risks, benefits, and side effects including potential falls, possible impaired driving and metabolic adversities among others. Patient is agreeable to call the office with any worsening of symptoms or onset of side effects. Patient is agreeable to call 911 or go to the nearest ER should he/she begin having SI/HI. No medication side effects or related complaints today.     MEDS ORDERED DURING VISIT:  No orders of the defined types were placed in this encounter.      Return in about 6 weeks (around 7/11/2025) for Video visit.         This document has been electronically signed by Kely Sloan MD  May 30, 2025 12:52 EDT    Dictated Utilizing Dragon Dictation: Part of this note may be an electronic transcription/translation of spoken language to printed text using the Dragon Dictation System.

## 2025-05-30 ENCOUNTER — TELEMEDICINE (OUTPATIENT)
Dept: PSYCHIATRY | Facility: CLINIC | Age: 55
End: 2025-05-30
Payer: COMMERCIAL

## 2025-05-30 DIAGNOSIS — R41.840 ATTENTION AND CONCENTRATION DEFICIT: ICD-10-CM

## 2025-05-30 DIAGNOSIS — F51.05 INSOMNIA DUE TO MENTAL CONDITION: ICD-10-CM

## 2025-05-30 DIAGNOSIS — F90.0 ADHD (ATTENTION DEFICIT HYPERACTIVITY DISORDER), INATTENTIVE TYPE: Primary | ICD-10-CM

## 2025-05-30 DIAGNOSIS — F41.1 GENERALIZED ANXIETY DISORDER: ICD-10-CM

## 2025-05-30 DIAGNOSIS — F33.1 MAJOR DEPRESSIVE DISORDER, RECURRENT EPISODE, MODERATE: ICD-10-CM

## 2025-05-30 NOTE — TREATMENT PLAN
Anxiety:  3/10 progressing    Goals:  Patient will develop and implement behavioral and cognitive strategies to reduce anxiety and irrational fears, monthly, using Rogerian psychotherapy and CBT where appropriate.  Help patient explore past emotional issues in relation to present anxiety, monthly, until remission of symptoms, using Rogerian psychotherapy and CBT where appropriate.  Help patient develop an awareness of their cognitive and physical responses to anxiety, monthly, until remission of symptoms, using Rogerian psychotherapy and CBT where appropriate.          Depression:  3/10 progressing    Goals:  Patient will demonstrate the ability to initiate new constructive life skills outside of sessions on a consistent basis, monthly, using Rogerian psychotherapy and CBT where appropriate.  Assist patient in setting attainable activities of daily living goals, monthly, using Rogerian psychotherapy and CBT where appropriate.  Provide education about depression, monthly, using Rogerian psychotherapy and CBT where appropriate.  Assist patient in developing healthy coping strategies, monthly, using Rogerian psychotherapy and CBT where appropriate.    Rogerian psychotherapy and CBT will be used to help accomplish the above goals and manage depression and anxiety related to work stresses, family well-being       I have discussed and reviewed this treatment plan with the patient.      Reviewed by Kely Sloan MD   05/30/2025

## 2025-06-02 ENCOUNTER — TELEPHONE (OUTPATIENT)
Dept: PSYCHIATRY | Facility: CLINIC | Age: 55
End: 2025-06-02
Payer: COMMERCIAL

## 2025-07-01 ENCOUNTER — TELEPHONE (OUTPATIENT)
Dept: PSYCHIATRY | Facility: CLINIC | Age: 55
End: 2025-07-01

## 2025-07-01 NOTE — TELEPHONE ENCOUNTER
ATTEMPTED TO CONTACT PT(PATIENT) PER OVERDUE LAB ORDERS PROTOCOL     PT(PATIENT) HAS OVERDUE LAB ORDERS     Urine Drug Screen - Urine, Clean Catch (05/30/2025 12:47)     PT(PATIENT) ADVISED TO COMPLETE ORDER VIA OUTPATIENT LAB SERVICES AT ANY OF THE FOLLOWING [facility] LOCATIONS     [place] [address] [address] [facility] [address]  MON - FRI 7AM-530PM  SAT 8AM-NOON  PHONE #[phone]  FAX#[phone]    [address]   MON-FRI 830AM-430PM  CLOSED SATURDAY AND SUNDAY   PHONE #[phone]    [facility] OUTPATIENT LAB   OPEN M-F 630AM - 5PM   CLOSED SATURDAY AND SUNDAY   [address] [place] [address]   PHONE #[phone]     [facility] OUTPATIENT LAB   [address]  OPEN M-F 630AM - 5PM   CLOSED SATURDAY AND SUNDAY   PHONE #[phone]     [facility] OUTPATIENT LAB   [address]  MON - FRI 730AM-4PM  CLOSED SATURDAY AND SUNDAY   PHONE #[phone]    [place] OUTPATIENT LAB   [address]  MON - FRI 730AM-4PM  CLOSED SATURDAY AND SUNDAY   PHONE #[phone]    [facility] OUTPATIENT LAB (MOVED TO [facility])  [address]   OPEN M-F 6AM - 6PM, SAT 6AM-12PM  PHONE #[phone] OR EXT [phone]  FAX #[phone]   EXT [id number]     NO ANSWER      LEFT VOICEMAIL WITH INSTRUCTIONS TO RETURN CALL TO OFFICE AT [phone]

## 2025-07-10 ENCOUNTER — LAB (OUTPATIENT)
Facility: HOSPITAL | Age: 55
End: 2025-07-10
Payer: COMMERCIAL

## 2025-07-10 DIAGNOSIS — F90.0 ADHD (ATTENTION DEFICIT HYPERACTIVITY DISORDER), INATTENTIVE TYPE: Primary | ICD-10-CM

## 2025-07-10 DIAGNOSIS — F90.0 ADHD (ATTENTION DEFICIT HYPERACTIVITY DISORDER), INATTENTIVE TYPE: ICD-10-CM

## 2025-07-10 LAB
AMPHET+METHAMPHET UR QL: NEGATIVE
AMPHETAMINES UR QL: NEGATIVE
BARBITURATES UR QL SCN: NEGATIVE
BENZODIAZ UR QL SCN: NEGATIVE
BUPRENORPHINE SERPL-MCNC: NEGATIVE NG/ML
CANNABINOIDS SERPL QL: POSITIVE
COCAINE UR QL: NEGATIVE
FENTANYL UR-MCNC: NEGATIVE NG/ML
METHADONE UR QL SCN: NEGATIVE
OPIATES UR QL: NEGATIVE
OXYCODONE UR QL SCN: NEGATIVE
PCP UR QL SCN: NEGATIVE
TRICYCLICS UR QL SCN: NEGATIVE

## 2025-07-10 PROCEDURE — 80307 DRUG TEST PRSMV CHEM ANLYZR: CPT

## 2025-07-10 RX ORDER — DEXMETHYLPHENIDATE HYDROCHLORIDE 5 MG/1
5 CAPSULE, EXTENDED RELEASE ORAL DAILY
Qty: 30 CAPSULE | Refills: 0 | Status: SHIPPED | OUTPATIENT
Start: 2025-07-10

## 2025-08-06 ENCOUNTER — TELEMEDICINE (OUTPATIENT)
Dept: PSYCHIATRY | Facility: CLINIC | Age: 55
End: 2025-08-06
Payer: COMMERCIAL

## 2025-08-06 DIAGNOSIS — R41.840 ATTENTION AND CONCENTRATION DEFICIT: ICD-10-CM

## 2025-08-06 DIAGNOSIS — F33.1 MAJOR DEPRESSIVE DISORDER, RECURRENT EPISODE, MODERATE: ICD-10-CM

## 2025-08-06 DIAGNOSIS — F90.0 ADHD (ATTENTION DEFICIT HYPERACTIVITY DISORDER), INATTENTIVE TYPE: Primary | ICD-10-CM

## 2025-08-06 DIAGNOSIS — F51.05 INSOMNIA DUE TO MENTAL CONDITION: ICD-10-CM

## 2025-08-06 DIAGNOSIS — F41.1 GENERALIZED ANXIETY DISORDER: ICD-10-CM
